# Patient Record
Sex: FEMALE | Employment: OTHER | URBAN - METROPOLITAN AREA
[De-identification: names, ages, dates, MRNs, and addresses within clinical notes are randomized per-mention and may not be internally consistent; named-entity substitution may affect disease eponyms.]

---

## 2024-01-17 ENCOUNTER — HOSPITAL ENCOUNTER (INPATIENT)
Age: 89
LOS: 21 days | Discharge: NURSING, REHAB OR LONG-TERM CARE | DRG: 305 | End: 2024-02-07
Attending: EMERGENCY MEDICINE | Admitting: INTERNAL MEDICINE
Payer: MEDICARE

## 2024-01-17 ENCOUNTER — APPOINTMENT (OUTPATIENT)
Dept: GENERAL RADIOLOGY | Age: 89
DRG: 305 | End: 2024-01-17
Payer: MEDICARE

## 2024-01-17 DIAGNOSIS — I16.0 HYPERTENSIVE URGENCY: ICD-10-CM

## 2024-01-17 DIAGNOSIS — E87.6 HYPOKALEMIA: Primary | ICD-10-CM

## 2024-01-17 LAB
ANION GAP SERPL CALCULATED.3IONS-SCNC: 10 MMOL/L (ref 9–17)
BACTERIA URNS QL MICRO: ABNORMAL
BASOPHILS # BLD: 0.1 K/UL (ref 0–0.2)
BASOPHILS NFR BLD: 1 % (ref 0–2)
BILIRUB UR QL STRIP: NEGATIVE
BUN SERPL-MCNC: 21 MG/DL (ref 8–23)
CALCIUM SERPL-MCNC: 9.3 MG/DL (ref 8.6–10.4)
CASTS #/AREA URNS LPF: ABNORMAL /LPF
CHLORIDE SERPL-SCNC: 103 MMOL/L (ref 98–107)
CLARITY UR: CLEAR
CO2 SERPL-SCNC: 28 MMOL/L (ref 20–31)
COLOR UR: YELLOW
CREAT SERPL-MCNC: 0.6 MG/DL (ref 0.5–0.9)
EOSINOPHIL # BLD: 0.1 K/UL (ref 0–0.4)
EOSINOPHILS RELATIVE PERCENT: 1 % (ref 0–4)
EPI CELLS #/AREA URNS HPF: ABNORMAL /HPF
ERYTHROCYTE [DISTWIDTH] IN BLOOD BY AUTOMATED COUNT: 13 % (ref 11.5–14.9)
GFR SERPL CREATININE-BSD FRML MDRD: >60 ML/MIN/1.73M2
GLUCOSE SERPL-MCNC: 97 MG/DL (ref 70–99)
GLUCOSE UR STRIP-MCNC: NEGATIVE MG/DL
HCT VFR BLD AUTO: 39.9 % (ref 36–46)
HGB BLD-MCNC: 12.4 G/DL (ref 12–16)
HGB UR QL STRIP.AUTO: NEGATIVE
KETONES UR STRIP-MCNC: NEGATIVE MG/DL
LEUKOCYTE ESTERASE UR QL STRIP: NEGATIVE
LYMPHOCYTES NFR BLD: 0.9 K/UL (ref 1–4.8)
LYMPHOCYTES RELATIVE PERCENT: 13 % (ref 24–44)
MCH RBC QN AUTO: 29.5 PG (ref 26–34)
MCHC RBC AUTO-ENTMCNC: 31.2 G/DL (ref 31–37)
MCV RBC AUTO: 94.6 FL (ref 80–100)
MONOCYTES NFR BLD: 0.7 K/UL (ref 0.1–1.3)
MONOCYTES NFR BLD: 10 % (ref 1–7)
NEUTROPHILS NFR BLD: 75 % (ref 36–66)
NEUTS SEG NFR BLD: 5.4 K/UL (ref 1.3–9.1)
NITRITE UR QL STRIP: NEGATIVE
PH UR STRIP: 6.5 [PH] (ref 5–8)
PLATELET # BLD AUTO: 326 K/UL (ref 150–450)
PMV BLD AUTO: 7.3 FL (ref 6–12)
POTASSIUM SERPL-SCNC: 3.3 MMOL/L (ref 3.7–5.3)
PROT UR STRIP-MCNC: ABNORMAL MG/DL
RBC # BLD AUTO: 4.21 M/UL (ref 4–5.2)
RBC #/AREA URNS HPF: ABNORMAL /HPF
SODIUM SERPL-SCNC: 141 MMOL/L (ref 135–144)
SP GR UR STRIP: 1.02 (ref 1–1.03)
UROBILINOGEN UR STRIP-ACNC: NORMAL EU/DL (ref 0–1)
WBC #/AREA URNS HPF: ABNORMAL /HPF
WBC OTHER # BLD: 7.2 K/UL (ref 3.5–11)

## 2024-01-17 PROCEDURE — 71045 X-RAY EXAM CHEST 1 VIEW: CPT

## 2024-01-17 PROCEDURE — 36415 COLL VENOUS BLD VENIPUNCTURE: CPT

## 2024-01-17 PROCEDURE — 81001 URINALYSIS AUTO W/SCOPE: CPT

## 2024-01-17 PROCEDURE — 6370000000 HC RX 637 (ALT 250 FOR IP)

## 2024-01-17 PROCEDURE — 93005 ELECTROCARDIOGRAM TRACING: CPT

## 2024-01-17 PROCEDURE — 6360000002 HC RX W HCPCS: Performed by: NURSE PRACTITIONER

## 2024-01-17 PROCEDURE — 99285 EMERGENCY DEPT VISIT HI MDM: CPT

## 2024-01-17 PROCEDURE — 2060000000 HC ICU INTERMEDIATE R&B

## 2024-01-17 PROCEDURE — 85025 COMPLETE CBC W/AUTO DIFF WBC: CPT

## 2024-01-17 PROCEDURE — 80048 BASIC METABOLIC PNL TOTAL CA: CPT

## 2024-01-17 PROCEDURE — 2580000003 HC RX 258: Performed by: NURSE PRACTITIONER

## 2024-01-17 RX ORDER — MAGNESIUM SULFATE HEPTAHYDRATE 40 MG/ML
2000 INJECTION, SOLUTION INTRAVENOUS PRN
Status: DISCONTINUED | OUTPATIENT
Start: 2024-01-17 | End: 2024-02-07 | Stop reason: HOSPADM

## 2024-01-17 RX ORDER — POTASSIUM CHLORIDE 7.45 MG/ML
10 INJECTION INTRAVENOUS PRN
Status: DISCONTINUED | OUTPATIENT
Start: 2024-01-17 | End: 2024-02-07 | Stop reason: HOSPADM

## 2024-01-17 RX ORDER — ONDANSETRON 2 MG/ML
4 INJECTION INTRAMUSCULAR; INTRAVENOUS EVERY 6 HOURS PRN
Status: DISCONTINUED | OUTPATIENT
Start: 2024-01-17 | End: 2024-02-07 | Stop reason: HOSPADM

## 2024-01-17 RX ORDER — SODIUM CHLORIDE 0.9 % (FLUSH) 0.9 %
5-40 SYRINGE (ML) INJECTION PRN
Status: DISCONTINUED | OUTPATIENT
Start: 2024-01-17 | End: 2024-02-07 | Stop reason: HOSPADM

## 2024-01-17 RX ORDER — ENOXAPARIN SODIUM 100 MG/ML
40 INJECTION SUBCUTANEOUS DAILY
Status: DISCONTINUED | OUTPATIENT
Start: 2024-01-18 | End: 2024-01-20

## 2024-01-17 RX ORDER — LISINOPRIL 5 MG/1
5 TABLET ORAL DAILY
Status: DISCONTINUED | OUTPATIENT
Start: 2024-01-18 | End: 2024-01-20

## 2024-01-17 RX ORDER — SODIUM CHLORIDE 9 MG/ML
INJECTION, SOLUTION INTRAVENOUS PRN
Status: DISCONTINUED | OUTPATIENT
Start: 2024-01-17 | End: 2024-02-07 | Stop reason: HOSPADM

## 2024-01-17 RX ORDER — HYDRALAZINE HYDROCHLORIDE 20 MG/ML
10 INJECTION INTRAMUSCULAR; INTRAVENOUS EVERY 6 HOURS PRN
Status: DISCONTINUED | OUTPATIENT
Start: 2024-01-17 | End: 2024-02-07 | Stop reason: HOSPADM

## 2024-01-17 RX ORDER — SODIUM CHLORIDE 0.9 % (FLUSH) 0.9 %
5-40 SYRINGE (ML) INJECTION EVERY 12 HOURS SCHEDULED
Status: DISCONTINUED | OUTPATIENT
Start: 2024-01-17 | End: 2024-02-07 | Stop reason: HOSPADM

## 2024-01-17 RX ORDER — ACETAMINOPHEN 650 MG/1
650 SUPPOSITORY RECTAL EVERY 6 HOURS PRN
Status: DISCONTINUED | OUTPATIENT
Start: 2024-01-17 | End: 2024-02-07 | Stop reason: HOSPADM

## 2024-01-17 RX ORDER — ONDANSETRON 4 MG/1
4 TABLET, ORALLY DISINTEGRATING ORAL EVERY 8 HOURS PRN
Status: DISCONTINUED | OUTPATIENT
Start: 2024-01-17 | End: 2024-02-07 | Stop reason: HOSPADM

## 2024-01-17 RX ORDER — POTASSIUM CHLORIDE 20 MEQ/1
40 TABLET, EXTENDED RELEASE ORAL PRN
Status: DISCONTINUED | OUTPATIENT
Start: 2024-01-17 | End: 2024-02-07 | Stop reason: HOSPADM

## 2024-01-17 RX ORDER — LISINOPRIL 20 MG/1
10 TABLET ORAL ONCE
Status: COMPLETED | OUTPATIENT
Start: 2024-01-17 | End: 2024-01-17

## 2024-01-17 RX ORDER — ACETAMINOPHEN 325 MG/1
650 TABLET ORAL EVERY 6 HOURS PRN
Status: DISCONTINUED | OUTPATIENT
Start: 2024-01-17 | End: 2024-02-07 | Stop reason: HOSPADM

## 2024-01-17 RX ORDER — POLYETHYLENE GLYCOL 3350 17 G/17G
17 POWDER, FOR SOLUTION ORAL DAILY PRN
Status: DISCONTINUED | OUTPATIENT
Start: 2024-01-17 | End: 2024-02-07 | Stop reason: HOSPADM

## 2024-01-17 RX ORDER — CARBOXYMETHYLCELLULOSE SODIUM 5 MG/ML
2 SOLUTION/ DROPS OPHTHALMIC ONCE
Status: COMPLETED | OUTPATIENT
Start: 2024-01-17 | End: 2024-01-17

## 2024-01-17 RX ADMIN — CARBOXYMETHYLCELLULOSE SODIUM 2 DROP: 5 SOLUTION/ DROPS OPHTHALMIC at 22:11

## 2024-01-17 RX ADMIN — SODIUM CHLORIDE, PRESERVATIVE FREE 10 ML: 5 INJECTION INTRAVENOUS at 23:29

## 2024-01-17 RX ADMIN — LISINOPRIL 10 MG: 20 TABLET ORAL at 22:11

## 2024-01-17 RX ADMIN — HYDRALAZINE HYDROCHLORIDE 10 MG: 20 INJECTION, SOLUTION INTRAMUSCULAR; INTRAVENOUS at 23:33

## 2024-01-17 RX ADMIN — POTASSIUM BICARBONATE 40 MEQ: 782 TABLET, EFFERVESCENT ORAL at 22:11

## 2024-01-17 ASSESSMENT — LIFESTYLE VARIABLES
HOW OFTEN DO YOU HAVE A DRINK CONTAINING ALCOHOL: NEVER
HOW MANY STANDARD DRINKS CONTAINING ALCOHOL DO YOU HAVE ON A TYPICAL DAY: PATIENT DOES NOT DRINK

## 2024-01-17 ASSESSMENT — PAIN - FUNCTIONAL ASSESSMENT: PAIN_FUNCTIONAL_ASSESSMENT: NONE - DENIES PAIN

## 2024-01-18 ENCOUNTER — APPOINTMENT (OUTPATIENT)
Age: 89
DRG: 305 | End: 2024-01-18
Payer: MEDICARE

## 2024-01-18 PROBLEM — Z91.199 NONCOMPLIANCE: Status: ACTIVE | Noted: 2024-01-18

## 2024-01-18 PROBLEM — R54 AGE-RELATED PHYSICAL DEBILITY: Status: ACTIVE | Noted: 2024-01-18

## 2024-01-18 PROBLEM — I47.10 SUPRAVENTRICULAR TACHYCARDIA: Status: ACTIVE | Noted: 2024-01-18

## 2024-01-18 PROBLEM — R46.0 POOR HYGIENE: Status: ACTIVE | Noted: 2024-01-18

## 2024-01-18 PROBLEM — I10 ESSENTIAL HYPERTENSION: Status: ACTIVE | Noted: 2024-01-18

## 2024-01-18 LAB
ANION GAP SERPL CALCULATED.3IONS-SCNC: 9 MMOL/L (ref 9–17)
BASOPHILS # BLD: 0.1 K/UL (ref 0–0.2)
BASOPHILS NFR BLD: 1 % (ref 0–2)
BUN SERPL-MCNC: 24 MG/DL (ref 8–23)
CALCIUM SERPL-MCNC: 8.4 MG/DL (ref 8.6–10.4)
CHLORIDE SERPL-SCNC: 109 MMOL/L (ref 98–107)
CO2 SERPL-SCNC: 26 MMOL/L (ref 20–31)
CREAT SERPL-MCNC: 0.5 MG/DL (ref 0.5–0.9)
ECHO BSA: 1.49 M2
ECHO EST RA PRESSURE: 3 MMHG
ECHO RIGHT VENTRICULAR SYSTOLIC PRESSURE (RVSP): 47 MMHG
ECHO TV REGURGITANT MAX VELOCITY: 3.3 M/S
EOSINOPHIL # BLD: 0.1 K/UL (ref 0–0.4)
EOSINOPHILS RELATIVE PERCENT: 3 % (ref 0–4)
ERYTHROCYTE [DISTWIDTH] IN BLOOD BY AUTOMATED COUNT: 12.5 % (ref 11.5–14.9)
GFR SERPL CREATININE-BSD FRML MDRD: >60 ML/MIN/1.73M2
GLUCOSE SERPL-MCNC: 92 MG/DL (ref 70–99)
HCT VFR BLD AUTO: 31 % (ref 36–46)
HGB BLD-MCNC: 10.2 G/DL (ref 12–16)
LYMPHOCYTES NFR BLD: 0.9 K/UL (ref 1–4.8)
LYMPHOCYTES RELATIVE PERCENT: 17 % (ref 24–44)
MCH RBC QN AUTO: 29.7 PG (ref 26–34)
MCHC RBC AUTO-ENTMCNC: 32.8 G/DL (ref 31–37)
MCV RBC AUTO: 90.6 FL (ref 80–100)
MONOCYTES NFR BLD: 0.8 K/UL (ref 0.1–1.3)
MONOCYTES NFR BLD: 14 % (ref 1–7)
NEUTROPHILS NFR BLD: 65 % (ref 36–66)
NEUTS SEG NFR BLD: 3.5 K/UL (ref 1.3–9.1)
PLATELET # BLD AUTO: 283 K/UL (ref 150–450)
PMV BLD AUTO: 7.5 FL (ref 6–12)
POTASSIUM SERPL-SCNC: 3.7 MMOL/L (ref 3.7–5.3)
RBC # BLD AUTO: 3.43 M/UL (ref 4–5.2)
SODIUM SERPL-SCNC: 144 MMOL/L (ref 135–144)
TROPONIN I SERPL HS-MCNC: 29 NG/L (ref 0–14)
WBC OTHER # BLD: 5.4 K/UL (ref 3.5–11)

## 2024-01-18 PROCEDURE — 36415 COLL VENOUS BLD VENIPUNCTURE: CPT

## 2024-01-18 PROCEDURE — 6370000000 HC RX 637 (ALT 250 FOR IP): Performed by: INTERNAL MEDICINE

## 2024-01-18 PROCEDURE — 97530 THERAPEUTIC ACTIVITIES: CPT

## 2024-01-18 PROCEDURE — 84484 ASSAY OF TROPONIN QUANT: CPT

## 2024-01-18 PROCEDURE — 2580000003 HC RX 258: Performed by: INTERNAL MEDICINE

## 2024-01-18 PROCEDURE — 80048 BASIC METABOLIC PNL TOTAL CA: CPT

## 2024-01-18 PROCEDURE — 93005 ELECTROCARDIOGRAM TRACING: CPT | Performed by: INTERNAL MEDICINE

## 2024-01-18 PROCEDURE — 2060000000 HC ICU INTERMEDIATE R&B

## 2024-01-18 PROCEDURE — 93306 TTE W/DOPPLER COMPLETE: CPT

## 2024-01-18 PROCEDURE — 97162 PT EVAL MOD COMPLEX 30 MIN: CPT

## 2024-01-18 PROCEDURE — 85025 COMPLETE CBC W/AUTO DIFF WBC: CPT

## 2024-01-18 PROCEDURE — 99223 1ST HOSP IP/OBS HIGH 75: CPT | Performed by: INTERNAL MEDICINE

## 2024-01-18 PROCEDURE — 97166 OT EVAL MOD COMPLEX 45 MIN: CPT

## 2024-01-18 PROCEDURE — 93306 TTE W/DOPPLER COMPLETE: CPT | Performed by: INTERNAL MEDICINE

## 2024-01-18 PROCEDURE — 6370000000 HC RX 637 (ALT 250 FOR IP): Performed by: NURSE PRACTITIONER

## 2024-01-18 PROCEDURE — 6360000002 HC RX W HCPCS: Performed by: NURSE PRACTITIONER

## 2024-01-18 RX ORDER — DILTIAZEM HYDROCHLORIDE 5 MG/ML
10 INJECTION INTRAVENOUS ONCE
Status: DISCONTINUED | OUTPATIENT
Start: 2024-01-18 | End: 2024-01-20

## 2024-01-18 RX ORDER — SODIUM CHLORIDE 9 MG/ML
INJECTION, SOLUTION INTRAVENOUS CONTINUOUS
Status: DISCONTINUED | OUTPATIENT
Start: 2024-01-18 | End: 2024-01-19

## 2024-01-18 RX ADMIN — METOPROLOL TARTRATE 12.5 MG: 25 TABLET, FILM COATED ORAL at 14:00

## 2024-01-18 RX ADMIN — SODIUM CHLORIDE: 9 INJECTION, SOLUTION INTRAVENOUS at 14:02

## 2024-01-18 RX ADMIN — LISINOPRIL 5 MG: 5 TABLET ORAL at 07:57

## 2024-01-18 RX ADMIN — ENOXAPARIN SODIUM 40 MG: 100 INJECTION SUBCUTANEOUS at 07:57

## 2024-01-18 ASSESSMENT — PAIN SCALES - GENERAL: PAINLEVEL_OUTOF10: 0

## 2024-01-18 NOTE — CARE COORDINATION
Case Management Assessment  Initial Evaluation    Date/Time of Evaluation: 1/18/2024 3:06 PM  Assessment Completed by: Thi Poe RN    If patient is discharged prior to next notation, then this note serves as note for discharge by case management.    Patient Name: Scarlett Ferguson                   YOB: 1931  Diagnosis: Hypokalemia [E87.6]  Hypertensive urgency [I16.0]                   Date / Time: 1/17/2024  7:52 PM    Patient Admission Status: Inpatient   Readmission Risk (Low < 19, Mod (19-27), High > 27): Readmission Risk Score: 9.8    Current PCP: No primary care provider on file.  PCP verified by CM? No (Has None)    Chart Reviewed: Yes      History Provided by: Patient  Patient Orientation: Alert and Oriented (Very [place])    Patient Cognition: Alert    Hospitalization in the last 30 days (Readmission):  No    If yes, Readmission Assessment in CM Navigator will be completed.    Advance Directives:      Code Status: Full Code   Patient's Primary Decision Maker is:        Discharge Planning:    Patient lives with: Alone Type of Home: House  Primary Care Giver: Self  Patient Support Systems include: [facility]   Current Financial resources: Medicare  Current community resources: None  Current services prior to admission: None            Current DME:              Type of Home Care services:  None    ADLS  Prior functional level: Independent in ADLs/IADLs  Current functional level: Other (see comment), Independent in ADLs/IADLs (Pt. has no Family members to assist, She does drive at times.)    PT AM-PAC: 10 /24  OT AM-PAC: 15 /24    Family can provide assistance at DC: No  Would you like Case Management to discuss the discharge plan with any other family members/significant others, and if so, who? No  Plans to Return to Present Housing: Other (see comment) (Very Poor living conditions, SNF, is rec. Pt. has 12 Cats, \"will NOT go to SNF, for the Cats come
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
## 2024-01-18 NOTE — ED NOTES
Pt provided with bed bath. Full linen change. Extensive debris from feet removed. Pt able to use BSC with minimal assist back to bed.
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
## 2024-01-18 NOTE — ED NOTES
Pt presents to the ED via squad along with [place] [employer]. Pt called an  out to her home earlier today. The  called 911 for a safety check after the  could not get into the home. [employer] observed \"deplorable\" living conditions when entering pt's home. [employer]  stated there was an extreme amount of cobwebs everywhere. Pt also has 4 cats that were urinating all over the house. Pt did not have working heat. Pt was utilizing a small space hater for heat. Pt was found sitting in a chair in soiled clothing. Pt was alert and oriented when talking to police. Pt refused to leave her home. Pt informed [employer] pt \"would ather die then leave her home.' TPD had to physically remove pt from her home in order to get pt to the hospital for an evaluation as it appears pt is not able to care for self. Pt has no heat in below zero weather conditions. [employer] competed a an application for a emergency admission. [employer] made a referral for Adult Protective Services.     SW will talk to pt once pt is medically cleared.    Detail Level: Simple Introduction Text (Please End With A Colon): The following procedure was deferred: Procedure To Be Performed At Next Visit: Excision
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
## 2024-01-19 PROBLEM — E87.6 HYPOKALEMIA: Status: ACTIVE | Noted: 2024-01-19

## 2024-01-19 LAB
ALBUMIN SERPL-MCNC: 3.3 G/DL (ref 3.5–5.2)
ALP SERPL-CCNC: 124 U/L (ref 35–104)
ALT SERPL-CCNC: 26 U/L (ref 5–33)
ANION GAP SERPL CALCULATED.3IONS-SCNC: 8 MMOL/L (ref 9–17)
AST SERPL-CCNC: 26 U/L
BASOPHILS # BLD: 0.1 K/UL (ref 0–0.2)
BASOPHILS NFR BLD: 1 % (ref 0–2)
BILIRUB SERPL-MCNC: 0.2 MG/DL (ref 0.3–1.2)
BUN SERPL-MCNC: 26 MG/DL (ref 8–23)
CALCIUM SERPL-MCNC: 8.3 MG/DL (ref 8.6–10.4)
CHLORIDE SERPL-SCNC: 110 MMOL/L (ref 98–107)
CO2 SERPL-SCNC: 25 MMOL/L (ref 20–31)
CREAT SERPL-MCNC: 0.6 MG/DL (ref 0.5–0.9)
EKG ATRIAL RATE: 53 BPM
EKG P AXIS: 67 DEGREES
EKG P-R INTERVAL: 146 MS
EKG Q-T INTERVAL: 306 MS
EKG Q-T INTERVAL: 452 MS
EKG QRS DURATION: 82 MS
EKG QRS DURATION: 90 MS
EKG QTC CALCULATION (BAZETT): 424 MS
EKG QTC CALCULATION (BAZETT): 490 MS
EKG R AXIS: 12 DEGREES
EKG R AXIS: 14 DEGREES
EKG T AXIS: -143 DEGREES
EKG T AXIS: 1 DEGREES
EKG VENTRICULAR RATE: 154 BPM
EKG VENTRICULAR RATE: 53 BPM
EOSINOPHIL # BLD: 0.1 K/UL (ref 0–0.4)
EOSINOPHILS RELATIVE PERCENT: 3 % (ref 0–4)
ERYTHROCYTE [DISTWIDTH] IN BLOOD BY AUTOMATED COUNT: 12.4 % (ref 11.5–14.9)
GFR SERPL CREATININE-BSD FRML MDRD: >60 ML/MIN/1.73M2
GLUCOSE SERPL-MCNC: 106 MG/DL (ref 70–99)
HCT VFR BLD AUTO: 30.1 % (ref 36–46)
HGB BLD-MCNC: 9.6 G/DL (ref 12–16)
LYMPHOCYTES NFR BLD: 1.1 K/UL (ref 1–4.8)
LYMPHOCYTES RELATIVE PERCENT: 24 % (ref 24–44)
MAGNESIUM SERPL-MCNC: 2 MG/DL (ref 1.6–2.6)
MCH RBC QN AUTO: 29.3 PG (ref 26–34)
MCHC RBC AUTO-ENTMCNC: 31.8 G/DL (ref 31–37)
MCV RBC AUTO: 92.1 FL (ref 80–100)
MONOCYTES NFR BLD: 0.7 K/UL (ref 0.1–1.3)
MONOCYTES NFR BLD: 15 % (ref 1–7)
NEUTROPHILS NFR BLD: 57 % (ref 36–66)
NEUTS SEG NFR BLD: 2.6 K/UL (ref 1.3–9.1)
PLATELET # BLD AUTO: 235 K/UL (ref 150–450)
PMV BLD AUTO: 7.3 FL (ref 6–12)
POTASSIUM SERPL-SCNC: 3.7 MMOL/L (ref 3.7–5.3)
PROT SERPL-MCNC: 5.4 G/DL (ref 6.4–8.3)
RBC # BLD AUTO: 3.27 M/UL (ref 4–5.2)
SODIUM SERPL-SCNC: 143 MMOL/L (ref 135–144)
WBC OTHER # BLD: 4.6 K/UL (ref 3.5–11)

## 2024-01-19 PROCEDURE — 93010 ELECTROCARDIOGRAM REPORT: CPT | Performed by: INTERNAL MEDICINE

## 2024-01-19 PROCEDURE — 85025 COMPLETE CBC W/AUTO DIFF WBC: CPT

## 2024-01-19 PROCEDURE — 6360000002 HC RX W HCPCS: Performed by: NURSE PRACTITIONER

## 2024-01-19 PROCEDURE — 97110 THERAPEUTIC EXERCISES: CPT

## 2024-01-19 PROCEDURE — 99232 SBSQ HOSP IP/OBS MODERATE 35: CPT | Performed by: INTERNAL MEDICINE

## 2024-01-19 PROCEDURE — 83735 ASSAY OF MAGNESIUM: CPT

## 2024-01-19 PROCEDURE — 97116 GAIT TRAINING THERAPY: CPT

## 2024-01-19 PROCEDURE — 6370000000 HC RX 637 (ALT 250 FOR IP): Performed by: INTERNAL MEDICINE

## 2024-01-19 PROCEDURE — 36415 COLL VENOUS BLD VENIPUNCTURE: CPT

## 2024-01-19 PROCEDURE — 99222 1ST HOSP IP/OBS MODERATE 55: CPT | Performed by: NURSE PRACTITIONER

## 2024-01-19 PROCEDURE — 6370000000 HC RX 637 (ALT 250 FOR IP): Performed by: NURSE PRACTITIONER

## 2024-01-19 PROCEDURE — 97530 THERAPEUTIC ACTIVITIES: CPT

## 2024-01-19 PROCEDURE — 2060000000 HC ICU INTERMEDIATE R&B

## 2024-01-19 PROCEDURE — 80053 COMPREHEN METABOLIC PANEL: CPT

## 2024-01-19 PROCEDURE — 2580000003 HC RX 258: Performed by: NURSE PRACTITIONER

## 2024-01-19 RX ADMIN — SODIUM CHLORIDE, PRESERVATIVE FREE 10 ML: 5 INJECTION INTRAVENOUS at 20:49

## 2024-01-19 RX ADMIN — HYDRALAZINE HYDROCHLORIDE 10 MG: 20 INJECTION, SOLUTION INTRAMUSCULAR; INTRAVENOUS at 16:56

## 2024-01-19 RX ADMIN — LISINOPRIL 5 MG: 5 TABLET ORAL at 08:55

## 2024-01-19 RX ADMIN — METOPROLOL TARTRATE 12.5 MG: 25 TABLET, FILM COATED ORAL at 08:54

## 2024-01-19 RX ADMIN — ENOXAPARIN SODIUM 40 MG: 100 INJECTION SUBCUTANEOUS at 08:54

## 2024-01-19 RX ADMIN — METOPROLOL TARTRATE 12.5 MG: 25 TABLET, FILM COATED ORAL at 20:49

## 2024-01-19 ASSESSMENT — PAIN SCALES - GENERAL: PAINLEVEL_OUTOF10: 0

## 2024-01-19 NOTE — CARE COORDINATION
Writer met with pt to discuss discharge plans.    Pt states she can stay with her neighbor Bridget until Spring. Writer spoke to Bridget via telephone. Bridget states there is no way this pt can stay with her. Bridget states she is willing to take care of pt cats while she is at rehab, but there is no key available.    Writer informed pt of this. Pt became increasingly anxious about cats saying they were going to die in the house without water. Writer tried to reassure pt Bridget went and looked through the window at the home.    Pt states writer needs to call a  so the [employer] can get into the home and take care of the cats.    Writer dialed Bridget for pt to speak with regarding the cats and a place to stay.    Writer placed call to [facility], placed report with Vandana. Vandana states there has been no report made for this pt since 2020. Per LSW notation on 1/17 [employer] made a referral for Adult Protective Services. [place] states report may not have yet been received.    Writer informed APS of  calling 911 unable to get in pt home. [employer] observed deplorable living conditions and stated there was an extreme amount of cobwebs everywhere. Pt has cats that are urinating all over the house and no working heat and was found sitting in her chair in soiled clothing.    Writer left facility of choice list with pt. At this time pt is not agreeable to choosing a facility and states she has to get home to her cats. Writer explained therapy is recommending therapy upon discharge and pt still refuses stating she has to get to her cats.  Electronically signed by LEONEL Yuen on 1/19/2024 at 3:19 PM

## 2024-01-19 NOTE — CARE COORDINATION
Writer spoke to Don, from [facility], & they can accept for services. He will follow.

## 2024-01-19 NOTE — CARE COORDINATION
Writer placed call to Bridget regarding  call she made. Bridget is unsure of of the  she called, she called from her brother's phone.    Bridget states the  stated pt must be present to open the home. Bridget states she will be home all day tomorrow and has no vehicle to  pt at the hospital, but is willing to meet pt at the house as she lives four houses away.    Writer explained the situation of ambulance  transporting the pt home and Bridget meeting pt at the house with the . Bridget is agreeable to helping with this situation tomorrow.   Electronically signed by LEONEL Yuen on 1/19/2024 at 4:45 PM

## 2024-01-20 LAB
ANION GAP SERPL CALCULATED.3IONS-SCNC: 10 MMOL/L (ref 9–17)
BASOPHILS # BLD: 0.1 K/UL (ref 0–0.2)
BASOPHILS NFR BLD: 1 % (ref 0–2)
BUN SERPL-MCNC: 24 MG/DL (ref 8–23)
CALCIUM SERPL-MCNC: 8.5 MG/DL (ref 8.6–10.4)
CHLORIDE SERPL-SCNC: 109 MMOL/L (ref 98–107)
CO2 SERPL-SCNC: 24 MMOL/L (ref 20–31)
CREAT SERPL-MCNC: 0.5 MG/DL (ref 0.5–0.9)
EOSINOPHIL # BLD: 0.1 K/UL (ref 0–0.4)
EOSINOPHILS RELATIVE PERCENT: 2 % (ref 0–4)
ERYTHROCYTE [DISTWIDTH] IN BLOOD BY AUTOMATED COUNT: 12.6 % (ref 11.5–14.9)
GFR SERPL CREATININE-BSD FRML MDRD: >60 ML/MIN/1.73M2
GLUCOSE SERPL-MCNC: 102 MG/DL (ref 70–99)
HCT VFR BLD AUTO: 29.8 % (ref 36–46)
HGB BLD-MCNC: 9.8 G/DL (ref 12–16)
LYMPHOCYTES NFR BLD: 1.4 K/UL (ref 1–4.8)
LYMPHOCYTES RELATIVE PERCENT: 24 % (ref 24–44)
MAGNESIUM SERPL-MCNC: 2.1 MG/DL (ref 1.6–2.6)
MCH RBC QN AUTO: 29.9 PG (ref 26–34)
MCHC RBC AUTO-ENTMCNC: 33.1 G/DL (ref 31–37)
MCV RBC AUTO: 90.5 FL (ref 80–100)
MONOCYTES NFR BLD: 0.6 K/UL (ref 0.1–1.3)
MONOCYTES NFR BLD: 11 % (ref 1–7)
NEUTROPHILS NFR BLD: 62 % (ref 36–66)
NEUTS SEG NFR BLD: 3.6 K/UL (ref 1.3–9.1)
PLATELET # BLD AUTO: 233 K/UL (ref 150–450)
PMV BLD AUTO: 7.9 FL (ref 6–12)
POTASSIUM SERPL-SCNC: 3.8 MMOL/L (ref 3.7–5.3)
RBC # BLD AUTO: 3.29 M/UL (ref 4–5.2)
SODIUM SERPL-SCNC: 143 MMOL/L (ref 135–144)
TSH SERPL DL<=0.05 MIU/L-ACNC: 2.52 UIU/ML (ref 0.3–5)
WBC OTHER # BLD: 5.9 K/UL (ref 3.5–11)

## 2024-01-20 PROCEDURE — 2580000003 HC RX 258: Performed by: NURSE PRACTITIONER

## 2024-01-20 PROCEDURE — 84443 ASSAY THYROID STIM HORMONE: CPT

## 2024-01-20 PROCEDURE — 6360000002 HC RX W HCPCS: Performed by: INTERNAL MEDICINE

## 2024-01-20 PROCEDURE — 36415 COLL VENOUS BLD VENIPUNCTURE: CPT

## 2024-01-20 PROCEDURE — 85025 COMPLETE CBC W/AUTO DIFF WBC: CPT

## 2024-01-20 PROCEDURE — 99232 SBSQ HOSP IP/OBS MODERATE 35: CPT | Performed by: INTERNAL MEDICINE

## 2024-01-20 PROCEDURE — 97530 THERAPEUTIC ACTIVITIES: CPT

## 2024-01-20 PROCEDURE — 6370000000 HC RX 637 (ALT 250 FOR IP): Performed by: INTERNAL MEDICINE

## 2024-01-20 PROCEDURE — 2060000000 HC ICU INTERMEDIATE R&B

## 2024-01-20 PROCEDURE — 97535 SELF CARE MNGMENT TRAINING: CPT

## 2024-01-20 PROCEDURE — 80048 BASIC METABOLIC PNL TOTAL CA: CPT

## 2024-01-20 PROCEDURE — 83735 ASSAY OF MAGNESIUM: CPT

## 2024-01-20 PROCEDURE — 6370000000 HC RX 637 (ALT 250 FOR IP): Performed by: NURSE PRACTITIONER

## 2024-01-20 RX ORDER — LISINOPRIL 20 MG/1
20 TABLET ORAL DAILY
Qty: 30 TABLET | Refills: 3 | Status: CANCELLED | OUTPATIENT
Start: 2024-01-21

## 2024-01-20 RX ORDER — ENOXAPARIN SODIUM 100 MG/ML
40 INJECTION SUBCUTANEOUS DAILY
Status: DISCONTINUED | OUTPATIENT
Start: 2024-01-20 | End: 2024-02-07 | Stop reason: HOSPADM

## 2024-01-20 RX ORDER — LANOLIN ALCOHOL/MO/W.PET/CERES
3 CREAM (GRAM) TOPICAL NIGHTLY PRN
Status: DISCONTINUED | OUTPATIENT
Start: 2024-01-20 | End: 2024-02-07 | Stop reason: HOSPADM

## 2024-01-20 RX ORDER — LISINOPRIL 20 MG/1
20 TABLET ORAL DAILY
Status: DISCONTINUED | OUTPATIENT
Start: 2024-01-20 | End: 2024-01-22

## 2024-01-20 RX ADMIN — SODIUM CHLORIDE, PRESERVATIVE FREE 10 ML: 5 INJECTION INTRAVENOUS at 10:35

## 2024-01-20 RX ADMIN — Medication 3 MG: at 22:39

## 2024-01-20 RX ADMIN — METOPROLOL TARTRATE 12.5 MG: 25 TABLET, FILM COATED ORAL at 19:29

## 2024-01-20 RX ADMIN — SODIUM CHLORIDE, PRESERVATIVE FREE 10 ML: 5 INJECTION INTRAVENOUS at 19:29

## 2024-01-20 RX ADMIN — METOPROLOL TARTRATE 12.5 MG: 25 TABLET, FILM COATED ORAL at 13:15

## 2024-01-20 RX ADMIN — LISINOPRIL 20 MG: 20 TABLET ORAL at 10:34

## 2024-01-20 RX ADMIN — ENOXAPARIN SODIUM 40 MG: 100 INJECTION SUBCUTANEOUS at 10:34

## 2024-01-20 NOTE — CARE COORDINATION
DISCHARGE PLANNING NOTE:    Informed Dr. Don that LEONEL Barreto, spoke with patient regarding her statement about wanting to go to Assisted Living. Mary Lou spoke with patient and she states that is her plan in about 3-4 months. Her plan for right now is still to go home with [facility].    Electronically signed by Jyoti Modi RN on 1/20/2024 at 10:12 AM     [id number]

## 2024-01-20 NOTE — CARE COORDINATION
Writer met with pt to discuss discuss discharge plans. Writer encouraged writer again to a placement at a skilled nursing facility for a few weeks for therapy. Pt states she is not interested and has a house to get rid of and wants to move into assisted living in a few months. Pt is agreeable to [facility].    Writer also helped pt look through belongings to obtain payment for services and to see if there was any key. Pt has storm door key but no house key.    Writer placed call to pt neighbor Bridget to verify she is still agreeable to meet pt at home today if  services can be obtained.    Writer will place calls to local locksmiths.  Electronically signed by LEONEL Yuen on 1/20/2024 at 10:12 AM    Writer spoke to Vero with [employer]' at [phone] in room with pt. Pt gave writer permission to give credit card information via telephone to confirm appointment. Appointment confirmed for [facility] lockout service, writer provided number for contact if there is a cancellation and Bridget's contact information has been given to the  service as point of contact.    Writer placed call to Bridget to confirm.    Writer placed call to [facility] for scheduling. Writer explained situation of  meeting pt and neighbor Bridget at the home. Forms faxed, agreeable to 1230 .    Writer provided pt with the 2023 'Older Adults Resource Guide' by the [facility] [employer] [facility] along with AOOA programs hand out.  Electronically signed by LEONEL Yuen on 1/20/2024 at 10:56 AM    Pt is now unable to transfer without two staff members per charge WYATT Mclain. Pt is still insisting on going home.   Writer placed call to RN PETRA lead Mimi regarding this issue. Mimi suggests a capacity evaluation on this pt and to verify there is PT/OT ordered for this pt.  Writer informed charge WYATT Mclain of this.  Electronically signed by LEONEL Yuen on 1/20/2024 at 1:06

## 2024-01-20 NOTE — DISCHARGE INSTR - COC
Continuity of Care Form    Patient Name: Scarlett Ferguson   :  11/3/1931  MRN:  [id number]    Admit date:  2024  Discharge date:      Code Status Order: DNR-CC   Advance Directives:     Admitting Physician:  Cherri Mattson MD  PCP: No primary care provider on file.    Discharging Nurse: Lurdes GILLETTE RN   Discharging Hospital Unit/Room#: 2108/2108-01  Discharging Unit Phone Number: [phone]    Emergency Contact:   Extended Emergency Contact Information  Primary Emergency Contact: amish baker  Mobile Phone: [phone]  Relation: Friend    Past Surgical History:  History reviewed. No pertinent surgical history.    Immunization History:     There is no immunization history on file for this patient.    Active Problems:  Patient Active Problem List   Diagnosis Code    Hypertensive urgency I16.0    Essential hypertension I10    Noncompliance Z91.199    Supraventricular tachycardia I47.10    Poor hygiene R46.0    Age-related physical debility R54    Hypokalemia E87.6       Isolation/Infection:   Isolation            No Isolation          Patient Infection Status       None to display            Nurse Assessment:  Last Vital Signs: BP (!) 151/68   Pulse 87   Temp 98.3 °F (36.8 °C) (Oral)   Resp 17   Ht 1.524 m (5')   Wt 52 kg (114 lb 10.2 oz)   SpO2 94%   BMI 22.39 kg/m²     Last documented pain score (0-10 scale): Pain Level: 0  Last Weight:   Wt Readings from Last 1 Encounters:   24 52 kg (114 lb 10.2 oz)     Mental Status:  disoriented and alert    IV Access:  - None    Nursing Mobility/ADLs:  Walking   Assisted  Transfer  Assisted  Bathing  Assisted  Dressing  Assisted  Toileting  Assisted  Feeding  Assisted  Med Admin  Assisted  Med Delivery   whole    Wound Care Documentation and Therapy:        Elimination:  Continence:   Bowel: Yes  Bladder: No  Urinary Catheter: None   Colostomy/Ileostomy/Ileal Conduit: No       Date of Last BM:     No intake or output data in the 24 hours ending 24

## 2024-01-20 NOTE — DISCHARGE INSTRUCTIONS
Recommend monitoring blood pressures at home  Follow-up with primary in 1 week with log of blood pressures

## 2024-01-20 NOTE — CARE COORDINATION
Transportation arranged for patient to go to [address] [place] [address] at 1230 via [facility] by wheelchair.    Pt neighbor [employer] ([phone]) is meeting at home address along with [employer] to obtain entry.  Electronically signed by LEONEL uYen on 1/20/2024 at 11:00 AM    Writer was told pt is not able to transfer without 2 staff members. [facility] transport cancelled.  Electronically signed by LEONEL Yuen on 1/20/2024 at 12:59 PM

## 2024-01-20 NOTE — CARE COORDINATION
IMM letter provided to patient.  Patient offered four hours to make informed decision regarding appeal process; patient agreeable to discharge.     Electronically signed by LEONEL Yuen on 1/20/2024 at 11:51 AM

## 2024-01-21 ENCOUNTER — APPOINTMENT (OUTPATIENT)
Dept: CT IMAGING | Age: 89
DRG: 305 | End: 2024-01-21
Payer: MEDICARE

## 2024-01-21 PROCEDURE — 71260 CT THORAX DX C+: CPT

## 2024-01-21 PROCEDURE — 6360000004 HC RX CONTRAST MEDICATION: Performed by: INTERNAL MEDICINE

## 2024-01-21 PROCEDURE — 6360000002 HC RX W HCPCS: Performed by: NURSE PRACTITIONER

## 2024-01-21 PROCEDURE — 2580000003 HC RX 258: Performed by: NURSE PRACTITIONER

## 2024-01-21 PROCEDURE — 1200000000 HC SEMI PRIVATE

## 2024-01-21 PROCEDURE — 6370000000 HC RX 637 (ALT 250 FOR IP): Performed by: NURSE PRACTITIONER

## 2024-01-21 PROCEDURE — 6360000002 HC RX W HCPCS: Performed by: INTERNAL MEDICINE

## 2024-01-21 PROCEDURE — 2580000003 HC RX 258: Performed by: INTERNAL MEDICINE

## 2024-01-21 PROCEDURE — 6370000000 HC RX 637 (ALT 250 FOR IP): Performed by: INTERNAL MEDICINE

## 2024-01-21 PROCEDURE — 99232 SBSQ HOSP IP/OBS MODERATE 35: CPT | Performed by: INTERNAL MEDICINE

## 2024-01-21 RX ORDER — 0.9 % SODIUM CHLORIDE 0.9 %
100 INTRAVENOUS SOLUTION INTRAVENOUS ONCE
Status: COMPLETED | OUTPATIENT
Start: 2024-01-21 | End: 2024-01-21

## 2024-01-21 RX ORDER — IBUPROFEN 400 MG/1
400 TABLET ORAL EVERY 6 HOURS PRN
Status: DISCONTINUED | OUTPATIENT
Start: 2024-01-21 | End: 2024-02-07 | Stop reason: HOSPADM

## 2024-01-21 RX ORDER — SODIUM CHLORIDE 0.9 % (FLUSH) 0.9 %
10 SYRINGE (ML) INJECTION PRN
Status: DISCONTINUED | OUTPATIENT
Start: 2024-01-21 | End: 2024-02-07 | Stop reason: HOSPADM

## 2024-01-21 RX ADMIN — IOPAMIDOL 75 ML: 755 INJECTION, SOLUTION INTRAVENOUS at 11:35

## 2024-01-21 RX ADMIN — SODIUM CHLORIDE, PRESERVATIVE FREE 10 ML: 5 INJECTION INTRAVENOUS at 20:17

## 2024-01-21 RX ADMIN — METOPROLOL TARTRATE 12.5 MG: 25 TABLET, FILM COATED ORAL at 10:41

## 2024-01-21 RX ADMIN — SODIUM CHLORIDE, PRESERVATIVE FREE 10 ML: 5 INJECTION INTRAVENOUS at 11:35

## 2024-01-21 RX ADMIN — ENOXAPARIN SODIUM 40 MG: 100 INJECTION SUBCUTANEOUS at 10:44

## 2024-01-21 RX ADMIN — SODIUM CHLORIDE 100 ML: 9 INJECTION, SOLUTION INTRAVENOUS at 11:36

## 2024-01-21 RX ADMIN — HYDRALAZINE HYDROCHLORIDE 10 MG: 20 INJECTION, SOLUTION INTRAMUSCULAR; INTRAVENOUS at 12:38

## 2024-01-21 RX ADMIN — IBUPROFEN 400 MG: 400 TABLET, FILM COATED ORAL at 20:16

## 2024-01-21 RX ADMIN — IBUPROFEN 400 MG: 400 TABLET, FILM COATED ORAL at 12:36

## 2024-01-21 RX ADMIN — LISINOPRIL 20 MG: 20 TABLET ORAL at 10:41

## 2024-01-21 RX ADMIN — METOPROLOL TARTRATE 12.5 MG: 25 TABLET, FILM COATED ORAL at 20:16

## 2024-01-21 ASSESSMENT — PAIN SCALES - GENERAL
PAINLEVEL_OUTOF10: 3
PAINLEVEL_OUTOF10: 3

## 2024-01-21 ASSESSMENT — PAIN DESCRIPTION - LOCATION: LOCATION: GENERALIZED

## 2024-01-21 ASSESSMENT — PAIN DESCRIPTION - DESCRIPTORS: DESCRIPTORS: ACHING

## 2024-01-21 NOTE — CARE COORDINATION
DISCHARGE PLANNING NOTE:    Discharge plan is undetermined at this time.  Plan was for patient to be discharged home with [facility] yesterday, however this was determined to be unsafe. Pt refuses SNF. Will need new plan tomorrow.    Electronically signed by Jyoti Modi RN on 1/21/2024 at 1:26 PM

## 2024-01-22 LAB
ANION GAP SERPL CALCULATED.3IONS-SCNC: 7 MMOL/L (ref 9–17)
BUN SERPL-MCNC: 28 MG/DL (ref 8–23)
CALCIUM SERPL-MCNC: 8.2 MG/DL (ref 8.6–10.4)
CHLORIDE SERPL-SCNC: 110 MMOL/L (ref 98–107)
CO2 SERPL-SCNC: 27 MMOL/L (ref 20–31)
CREAT SERPL-MCNC: 0.6 MG/DL (ref 0.5–0.9)
GFR SERPL CREATININE-BSD FRML MDRD: >60 ML/MIN/1.73M2
GLUCOSE SERPL-MCNC: 104 MG/DL (ref 70–99)
POTASSIUM SERPL-SCNC: 3.9 MMOL/L (ref 3.7–5.3)
SODIUM SERPL-SCNC: 144 MMOL/L (ref 135–144)

## 2024-01-22 PROCEDURE — 97535 SELF CARE MNGMENT TRAINING: CPT

## 2024-01-22 PROCEDURE — 6370000000 HC RX 637 (ALT 250 FOR IP): Performed by: INTERNAL MEDICINE

## 2024-01-22 PROCEDURE — 80048 BASIC METABOLIC PNL TOTAL CA: CPT

## 2024-01-22 PROCEDURE — 2580000003 HC RX 258: Performed by: NURSE PRACTITIONER

## 2024-01-22 PROCEDURE — 36415 COLL VENOUS BLD VENIPUNCTURE: CPT

## 2024-01-22 PROCEDURE — 6360000002 HC RX W HCPCS: Performed by: NURSE PRACTITIONER

## 2024-01-22 PROCEDURE — 99232 SBSQ HOSP IP/OBS MODERATE 35: CPT | Performed by: INTERNAL MEDICINE

## 2024-01-22 PROCEDURE — 97530 THERAPEUTIC ACTIVITIES: CPT

## 2024-01-22 PROCEDURE — 97110 THERAPEUTIC EXERCISES: CPT

## 2024-01-22 PROCEDURE — 6360000002 HC RX W HCPCS: Performed by: INTERNAL MEDICINE

## 2024-01-22 PROCEDURE — 97116 GAIT TRAINING THERAPY: CPT

## 2024-01-22 PROCEDURE — 1200000000 HC SEMI PRIVATE

## 2024-01-22 RX ORDER — LISINOPRIL 20 MG/1
40 TABLET ORAL DAILY
Status: DISCONTINUED | OUTPATIENT
Start: 2024-01-23 | End: 2024-02-07 | Stop reason: HOSPADM

## 2024-01-22 RX ORDER — LISINOPRIL 20 MG/1
20 TABLET ORAL ONCE
Status: COMPLETED | OUTPATIENT
Start: 2024-01-22 | End: 2024-01-22

## 2024-01-22 RX ORDER — HYDRALAZINE HYDROCHLORIDE 20 MG/ML
10 INJECTION INTRAMUSCULAR; INTRAVENOUS ONCE
Status: COMPLETED | OUTPATIENT
Start: 2024-01-22 | End: 2024-01-22

## 2024-01-22 RX ADMIN — METOPROLOL TARTRATE 12.5 MG: 25 TABLET, FILM COATED ORAL at 21:06

## 2024-01-22 RX ADMIN — HYDRALAZINE HYDROCHLORIDE 10 MG: 20 INJECTION, SOLUTION INTRAMUSCULAR; INTRAVENOUS at 12:21

## 2024-01-22 RX ADMIN — LISINOPRIL 20 MG: 20 TABLET ORAL at 10:03

## 2024-01-22 RX ADMIN — HYDRALAZINE HYDROCHLORIDE 10 MG: 20 INJECTION, SOLUTION INTRAMUSCULAR; INTRAVENOUS at 17:06

## 2024-01-22 RX ADMIN — METOPROLOL TARTRATE 12.5 MG: 25 TABLET, FILM COATED ORAL at 10:02

## 2024-01-22 RX ADMIN — SODIUM CHLORIDE, PRESERVATIVE FREE 10 ML: 5 INJECTION INTRAVENOUS at 10:22

## 2024-01-22 RX ADMIN — LISINOPRIL 20 MG: 20 TABLET ORAL at 17:06

## 2024-01-22 RX ADMIN — ENOXAPARIN SODIUM 40 MG: 100 INJECTION SUBCUTANEOUS at 10:02

## 2024-01-22 ASSESSMENT — PAIN SCALES - GENERAL
PAINLEVEL_OUTOF10: 0

## 2024-01-22 NOTE — CARE COORDINATION
Writer notified Dr Don to see if we can get a psych consult to check her capacity to make sure she is able to make decisions.  Will wait for that psych note.    Writer called Adult protective Services and spoke to her  assigned to her case Luis Fernando  [phone] - Luis Fernando asked about her capacity, We advised we are waiting for psych to see her today.  Luis Fernando advised she is scheduled to some see her on 1/24/24 but if she is discharged home before then to call her.  Luis Fernando advised that if she is alert and oriented and has capacity, We can't hold her here.    Writer also reached out to Santino in Security to see if he can try finding any living relatives of patient.  He is investigating and will update writer when he has something.    Electronically signed by Karissa Olguin RN on 1/22/2024 at 10:21 AM

## 2024-01-23 PROBLEM — F05 DELIRIUM DUE TO ANOTHER MEDICAL CONDITION: Status: ACTIVE | Noted: 2024-01-23

## 2024-01-23 PROCEDURE — 6360000002 HC RX W HCPCS: Performed by: NURSE PRACTITIONER

## 2024-01-23 PROCEDURE — 90792 PSYCH DIAG EVAL W/MED SRVCS: CPT | Performed by: PSYCHIATRY & NEUROLOGY

## 2024-01-23 PROCEDURE — 99232 SBSQ HOSP IP/OBS MODERATE 35: CPT | Performed by: INTERNAL MEDICINE

## 2024-01-23 PROCEDURE — 97530 THERAPEUTIC ACTIVITIES: CPT

## 2024-01-23 PROCEDURE — 1200000000 HC SEMI PRIVATE

## 2024-01-23 PROCEDURE — 6360000002 HC RX W HCPCS: Performed by: INTERNAL MEDICINE

## 2024-01-23 PROCEDURE — 97535 SELF CARE MNGMENT TRAINING: CPT

## 2024-01-23 PROCEDURE — 97110 THERAPEUTIC EXERCISES: CPT

## 2024-01-23 PROCEDURE — 6370000000 HC RX 637 (ALT 250 FOR IP): Performed by: INTERNAL MEDICINE

## 2024-01-23 PROCEDURE — 97116 GAIT TRAINING THERAPY: CPT

## 2024-01-23 RX ORDER — HYDROCHLOROTHIAZIDE 25 MG/1
12.5 TABLET ORAL DAILY
Status: DISCONTINUED | OUTPATIENT
Start: 2024-01-23 | End: 2024-02-03

## 2024-01-23 RX ADMIN — ENOXAPARIN SODIUM 40 MG: 100 INJECTION SUBCUTANEOUS at 11:02

## 2024-01-23 RX ADMIN — LISINOPRIL 40 MG: 20 TABLET ORAL at 11:02

## 2024-01-23 RX ADMIN — HYDRALAZINE HYDROCHLORIDE 10 MG: 20 INJECTION, SOLUTION INTRAMUSCULAR; INTRAVENOUS at 06:38

## 2024-01-23 RX ADMIN — METOPROLOL TARTRATE 12.5 MG: 25 TABLET, FILM COATED ORAL at 11:02

## 2024-01-23 RX ADMIN — METOPROLOL TARTRATE 12.5 MG: 25 TABLET, FILM COATED ORAL at 19:32

## 2024-01-23 RX ADMIN — HYDROCHLOROTHIAZIDE 12.5 MG: 25 TABLET ORAL at 13:15

## 2024-01-23 ASSESSMENT — PAIN SCALES - GENERAL
PAINLEVEL_OUTOF10: 0

## 2024-01-23 NOTE — CONSULTS
Palliative Care Inpatient Consult    NAME:  Scarlett Ferguson  MEDICAL RECORD NUMBER:  [id number]  AGE: 89 y.o.   GENDER: female  : 11/3/1931  TODAY'S DATE:  2024    Reasons for Consultation:    Symptom and/or pain management  Provision of information regarding PC and/or hospice philosophies  Complex, time-intensive communication and interdisciplinary psychosocial support  Clarification of goals of care and/or assistance with difficult decision-making  Guidance in regards to resources and transition(s)    Members of PC team contributing to this consultation are : Elsa Mcdonnell, Palliative Care APRN-[facility]    Plan      Palliative Interaction:    I went to see patient today who was sitting in bed originally sleeping. She immediately awoke to verbal stimuli. Patient was pleasant and AOx3. She reports that she has not taken her at-home cataracts medication for the past 2 days and reports fatigue the past few weeks.     I explained my Palliative role and discussed about her symptoms and health status. Patient had a swollen, red left eye with ectropion and her left elbow had a healing lesion. She denies any pain, including no pain in her left eye, chest pain, and SOB.     I discussed Code Status options and [place] Next of Kin law. Patient reported that she does not want to go to an ICU, does not want to be intubated, and does not want any aggressive and/or invasive treatments. I informed her that her choices reflected DNR-CC. She was agreeable. I gave her a DNR form to sign and explained DNR-CC again. Patient voiced understanding and signed the form with 2 nurses and PA-S present. Patient is now DNR-CC. She also reports that although her neighbor, Bridget, is her current contact, patient does not want her to be the primary decision maker. She reports that she is on drugs. Patient does not have a spouse, children, grandchildren, or siblings. However, she reports having nieces and nephews in [place], but is not close to 
Department of Psychiatry  Behavioral Health Consult    REASON FOR CONSULT: decision capacity evaluation     CONSULTING PHYSICIAN: Dr. Don     History obtained from: patient, chart    HISTORY OF PRESENT ILLNESS:    The patient is a 89 y.o. female with no significant past psychiatric history who presented to the hospital with weakness and inability to care for herself and is admitted to the hospital for hypertensive urgency. Also found to have bradycardia and SVT. Psychiatry consulted to assess decision making capacity.     Patient seen and evaluated at bedside 1/23. Noted bradycardia with severely elevated BP, Qtc 490. Patient sitting up in bed with legs off the side and eating at bedside table. She is pleasant, is hard of hearing, and makes good eye contact. She states she just wants to go home. She states she was forcibly brought to the hospital by 5 police officers after \"someone bad\" told them she was there and not doing well. According to EMS, she was found by an  living in very poor conditions with soiled clothing, poor hygiene, 1 working light in the house, and no heat. She was using a space heater but was too weak to get up from her chair in her home. She admits to using \"several space heaters\" to heat her home, but states she is able to cook, clean, feed, bath, and pay her own bills. She acknowledges her severely elevated blood pressure, but states this is just due to her being brought to the hospital and believes being in the hospital is making her blood pressure worse. She does not understand the risks/benefits of treatment for her current medical conditions. She states she also has arthritis and allergies for which she takes some medications such as ibuprofen and an allergy pill, but denies any other medical conditions including denying blood pressure issues. She repeatedly states that she would like to go back to her house to get \"everything in order\" and to take care of her 12 cats, and 
CVA.  She had a generalized weakness with no focal deficit.  On admission her blood pressure was elevated.  She takes over-the-counter medication for her arthritis and antiallergy medication .patient also said that she never had any problem she walks around she do yard work she does get some palpitations sometime and it goes away when she rest . Her initial electrocardiogram showed sinus bradycardia, PAC.  On admission blood pressure was elevated up to 216/106, heart rate was 68 to 84 bpm, temperature 97.6, oxygen saturation 95%  On admission sodium 141, potassium 3.3, BUN 21, creatinine 0.6  WBC 7.2, hemoglobin 12.4, platelets 326  Chest x-ray showed no acute cardiopulmonary process  She was started on lisinopril.  She had episode of supraventricular tachycardia heart rate up to 150.  She was started on metoprolol 12.5 mg twice daily and she had episode of bradycardia heart rate below 40 while she was asleep.  Beta-blockers discontinued        Current evaluation  Frail looking female she was having her lunch  She can get out of bed without help  Able to walk few steps without help  She denies any chest pain or palpitation  No further episode of bradycardia  Current heart rate about 85 bpm occasional PAC as per ECG monitor  Chest expansion is poor, diminished breath sounds no sign of pleural  No peripheral edema    Lab work 1/20/2024  TSH 2.52  Hemoglobin 9.8, WBC 5.9, platelets 233  Sodium 143, potassium 3.8, CO2 24, BUN 24, creatinine 0.5, magnesium 2.1    Medications:   Scheduled Meds:   lisinopril  20 mg Oral Daily    enoxaparin  40 mg SubCUTAneous Daily    sodium chloride flush  5-40 mL IntraVENous 2 times per day     Continuous Infusions:   sodium chloride       CBC:   Recent Labs     01/18/24  0538 01/19/24  0506 01/20/24  0538   WBC 5.4 4.6 5.9   HGB 10.2* 9.6* 9.8*    235 233     BMP:    Recent Labs     01/18/24  0538 01/19/24  0506 01/20/24  0538    143 143   K 3.7 3.7 3.8   * 110* 109*

## 2024-01-23 NOTE — CARE COORDINATION
ONGOING DISCHARGE PLAN:    Patient is alert and oriented. Patient states she needs to return home today for her cats (reportedly has 12 cats at home).     DME: Shower chair, walker.    VNS: Cannot find accepting company.    Patient adamantly refuses SNF stating that she needs to return home to her pets. Psych consulted to determine capacity. If patient has capacity to make decisions we will need to ensure that she has a  in order to get into her home as the police who had been called had closed her home and her keys are inside.    Will continue to follow for additional discharge needs.    If patient is discharged prior to next notation, then this note serves as note for discharge by case management.    Electronically signed by Maria De Jesus Underwood RN on 1/23/2024 at 9:01 AM

## 2024-01-24 PROBLEM — F03.90 MAJOR NEUROCOGNITIVE DISORDER (HCC): Status: ACTIVE | Noted: 2024-01-24

## 2024-01-24 PROCEDURE — 6370000000 HC RX 637 (ALT 250 FOR IP): Performed by: INTERNAL MEDICINE

## 2024-01-24 PROCEDURE — 97535 SELF CARE MNGMENT TRAINING: CPT

## 2024-01-24 PROCEDURE — 97116 GAIT TRAINING THERAPY: CPT

## 2024-01-24 PROCEDURE — 1200000000 HC SEMI PRIVATE

## 2024-01-24 PROCEDURE — 6360000002 HC RX W HCPCS: Performed by: INTERNAL MEDICINE

## 2024-01-24 PROCEDURE — 99231 SBSQ HOSP IP/OBS SF/LOW 25: CPT | Performed by: INTERNAL MEDICINE

## 2024-01-24 PROCEDURE — 99232 SBSQ HOSP IP/OBS MODERATE 35: CPT | Performed by: PSYCHIATRY & NEUROLOGY

## 2024-01-24 RX ADMIN — HYDROCHLOROTHIAZIDE 12.5 MG: 25 TABLET ORAL at 08:52

## 2024-01-24 RX ADMIN — LISINOPRIL 40 MG: 20 TABLET ORAL at 08:52

## 2024-01-24 RX ADMIN — ENOXAPARIN SODIUM 40 MG: 100 INJECTION SUBCUTANEOUS at 08:52

## 2024-01-24 RX ADMIN — METOPROLOL TARTRATE 12.5 MG: 25 TABLET, FILM COATED ORAL at 08:52

## 2024-01-24 RX ADMIN — METOPROLOL TARTRATE 12.5 MG: 25 TABLET, FILM COATED ORAL at 21:36

## 2024-01-24 NOTE — ACP (ADVANCE CARE PLANNING)
..Advance Care Planning     Advance Care Planning Activator (Inpatient)  Conversation Note      Date of ACP Conversation: 1/24/2024     Conversation Conducted with: The patient has no biological family.  We are obtaining an appointed guardian for the patient as she lacks capacity.    Evans Benz has been contacted he is working on obtaining guardianship for the patient. See care coordination note 1/24/2024    ACP Activator: Vangie Allred RN          Health Care Decision Maker:     Current Designated Health Care Decision Maker:     Click here to complete Healthcare Decision Makers including section of the Healthcare Decision Maker Relationship (ie \"Primary\")      Care Preferences    Ventilation:  \"If you were in your present state of health and suddenly became very ill and were unable to breathe on your own, what would your preference be about the use of a ventilator (breathing machine) if it were available to you?\"      Would the patient desire the use of ventilator (breathing machine)?: no    \"If your health worsens and it becomes clear that your chance of recovery is unlikely, what would your preference be about the use of a ventilator (breathing machine) if it were available to you?\"     Would the patient desire the use of ventilator (breathing machine)?: No      Resuscitation  \"CPR works best to restart the heart when there is a sudden event, like a heart attack, in someone who is otherwise healthy. Unfortunately, CPR does not typically restart the heart for people who have serious health conditions or who are very sick.\"    \"In the event your heart stopped as a result of an underlying serious health condition, would you want attempts to be made to restart your heart (answer \"yes\" for attempt to resuscitate) or would you prefer a natural death (answer \"no\" for do not attempt to resuscitate)?\" no       [] Yes   [] No   Educated Patient / Decision Maker regarding differences between Advance Directives and

## 2024-01-24 NOTE — CARE COORDINATION
received a call from Yvonne jon [facility] and she is running this with her medical director and will call jayjayr back.    Landon from [facility] called and she advised they can accept patient.     Waiting on the other 2 referral decisions then will call Ed davis with list of who can accept patient based on his choices.    Electronically signed by Karissa Olguin RN on 1/24/2024 at 3:05 PM

## 2024-01-24 NOTE — CARE COORDINATION
Writer received a call from Landon from [facility] and advised that her  spoke with Ed Boyd about placement and he was happy to accept for her to go there.  Landon advised they will start the pre cert for patient today.    Electronically signed by Karissa Olguin RN on 1/24/2024 at 4:01 PM

## 2024-01-24 NOTE — CARE COORDINATION
Writer received completed Expert eval from Dr Shaikh.  Writer called  Evanscarlie Boyd [phone] and spoke to him about this patient.  Silvio  advised to send him copies of Expert eval and facesheet and he will work on guardianship for this patient. The earliest for court hearing is Next Tuesday or Wednesday.  He would like referrals sent to [facility], [facility] in [place] and [facility].  Then let him know which ones will accept her.     Electronically signed by Karissa Olguin RN on 1/24/2024 at 11:22 AM     Referrals faxed to [facility], [facility], [facility] and [facility].  Waiting on responses.    Electronically signed by Karissa Olguin RN on 1/24/2024 at 11:44 AM

## 2024-01-25 LAB
FOLATE SERPL-MCNC: >20 NG/ML (ref 4.8–24.2)
VIT B12 SERPL-MCNC: 204 PG/ML (ref 232–1245)

## 2024-01-25 PROCEDURE — 97110 THERAPEUTIC EXERCISES: CPT

## 2024-01-25 PROCEDURE — 97116 GAIT TRAINING THERAPY: CPT

## 2024-01-25 PROCEDURE — 82607 VITAMIN B-12: CPT

## 2024-01-25 PROCEDURE — 6370000000 HC RX 637 (ALT 250 FOR IP): Performed by: INTERNAL MEDICINE

## 2024-01-25 PROCEDURE — 6360000002 HC RX W HCPCS: Performed by: INTERNAL MEDICINE

## 2024-01-25 PROCEDURE — 36415 COLL VENOUS BLD VENIPUNCTURE: CPT

## 2024-01-25 PROCEDURE — 99232 SBSQ HOSP IP/OBS MODERATE 35: CPT | Performed by: INTERNAL MEDICINE

## 2024-01-25 PROCEDURE — 82746 ASSAY OF FOLIC ACID SERUM: CPT

## 2024-01-25 PROCEDURE — 99232 SBSQ HOSP IP/OBS MODERATE 35: CPT | Performed by: PSYCHIATRY & NEUROLOGY

## 2024-01-25 PROCEDURE — 1200000000 HC SEMI PRIVATE

## 2024-01-25 RX ORDER — UBIDECARENONE 75 MG
50 CAPSULE ORAL DAILY
Status: DISCONTINUED | OUTPATIENT
Start: 2024-01-25 | End: 2024-01-29

## 2024-01-25 RX ORDER — CYANOCOBALAMIN 1000 UG/ML
1000 INJECTION, SOLUTION INTRAMUSCULAR; SUBCUTANEOUS ONCE
Status: COMPLETED | OUTPATIENT
Start: 2024-01-25 | End: 2024-01-25

## 2024-01-25 RX ADMIN — HYDROCHLOROTHIAZIDE 12.5 MG: 25 TABLET ORAL at 08:00

## 2024-01-25 RX ADMIN — LISINOPRIL 40 MG: 20 TABLET ORAL at 08:00

## 2024-01-25 RX ADMIN — METOPROLOL TARTRATE 12.5 MG: 25 TABLET, FILM COATED ORAL at 21:01

## 2024-01-25 RX ADMIN — ENOXAPARIN SODIUM 40 MG: 100 INJECTION SUBCUTANEOUS at 08:00

## 2024-01-25 RX ADMIN — METOPROLOL TARTRATE 12.5 MG: 25 TABLET, FILM COATED ORAL at 08:00

## 2024-01-25 RX ADMIN — Medication 50 MCG: at 13:12

## 2024-01-25 RX ADMIN — CYANOCOBALAMIN 1000 MCG: 1000 INJECTION, SOLUTION INTRAMUSCULAR; SUBCUTANEOUS at 13:13

## 2024-01-25 NOTE — CARE COORDINATION
Writer received a message from Evans Boyd  advising Court has been confirmed for 2/1/24 at 11:00a.m. for legal guardianship    Electronically signed by Karissa Olguin RN on 1/25/2024 at 12:47 PM

## 2024-01-25 NOTE — CARE COORDINATION
Pre cert for [facility] has been approved.   However court hearing is Tuesday at 11;00, will need [facility] to restart pre cert to admit on Tuesday.    Electronically signed by Karissa Olguin RN on 1/25/2024 at 4:43 PM     Correction to note above.  Court date is Thursday 2/1/24 at 11:00a.m.    Electronically signed by Karissa Olguin RN on 1/26/2024 at 12:37 PM

## 2024-01-26 LAB
ANION GAP SERPL CALCULATED.3IONS-SCNC: 11 MMOL/L (ref 9–17)
BUN SERPL-MCNC: 32 MG/DL (ref 8–23)
CALCIUM SERPL-MCNC: 9 MG/DL (ref 8.6–10.4)
CHLORIDE SERPL-SCNC: 102 MMOL/L (ref 98–107)
CO2 SERPL-SCNC: 29 MMOL/L (ref 20–31)
CREAT SERPL-MCNC: 0.8 MG/DL (ref 0.5–0.9)
GFR SERPL CREATININE-BSD FRML MDRD: >60 ML/MIN/1.73M2
GLUCOSE SERPL-MCNC: 94 MG/DL (ref 70–99)
POTASSIUM SERPL-SCNC: 4.2 MMOL/L (ref 3.7–5.3)
SODIUM SERPL-SCNC: 142 MMOL/L (ref 135–144)

## 2024-01-26 PROCEDURE — 36415 COLL VENOUS BLD VENIPUNCTURE: CPT

## 2024-01-26 PROCEDURE — 99232 SBSQ HOSP IP/OBS MODERATE 35: CPT | Performed by: PSYCHIATRY & NEUROLOGY

## 2024-01-26 PROCEDURE — 1200000000 HC SEMI PRIVATE

## 2024-01-26 PROCEDURE — 97110 THERAPEUTIC EXERCISES: CPT

## 2024-01-26 PROCEDURE — 97530 THERAPEUTIC ACTIVITIES: CPT

## 2024-01-26 PROCEDURE — 6360000002 HC RX W HCPCS: Performed by: INTERNAL MEDICINE

## 2024-01-26 PROCEDURE — 6370000000 HC RX 637 (ALT 250 FOR IP): Performed by: INTERNAL MEDICINE

## 2024-01-26 PROCEDURE — 80048 BASIC METABOLIC PNL TOTAL CA: CPT

## 2024-01-26 PROCEDURE — 99232 SBSQ HOSP IP/OBS MODERATE 35: CPT | Performed by: INTERNAL MEDICINE

## 2024-01-26 PROCEDURE — 97116 GAIT TRAINING THERAPY: CPT

## 2024-01-26 RX ADMIN — LISINOPRIL 40 MG: 20 TABLET ORAL at 09:17

## 2024-01-26 RX ADMIN — METOPROLOL TARTRATE 12.5 MG: 25 TABLET, FILM COATED ORAL at 09:17

## 2024-01-26 RX ADMIN — HYDROCHLOROTHIAZIDE 12.5 MG: 25 TABLET ORAL at 09:17

## 2024-01-26 RX ADMIN — ENOXAPARIN SODIUM 40 MG: 100 INJECTION SUBCUTANEOUS at 09:17

## 2024-01-26 RX ADMIN — METOPROLOL TARTRATE 12.5 MG: 25 TABLET, FILM COATED ORAL at 19:57

## 2024-01-26 RX ADMIN — Medication 50 MCG: at 09:20

## 2024-01-26 NOTE — CARE COORDINATION
Writer is following for potential discharge plans. Per RN CM lead Mimi notation, authorization was approved for [facility] and will need to be restarted.    Pt legal guardianship hearing will be Thursday 2/1/24.  Electronically signed by LEONEL Yuen on 1/26/2024 at 12:09 PM

## 2024-01-27 PROCEDURE — 6370000000 HC RX 637 (ALT 250 FOR IP): Performed by: INTERNAL MEDICINE

## 2024-01-27 PROCEDURE — 97116 GAIT TRAINING THERAPY: CPT

## 2024-01-27 PROCEDURE — 99232 SBSQ HOSP IP/OBS MODERATE 35: CPT | Performed by: INTERNAL MEDICINE

## 2024-01-27 PROCEDURE — 1200000000 HC SEMI PRIVATE

## 2024-01-27 PROCEDURE — 6360000002 HC RX W HCPCS: Performed by: INTERNAL MEDICINE

## 2024-01-27 PROCEDURE — 97110 THERAPEUTIC EXERCISES: CPT

## 2024-01-27 RX ADMIN — LISINOPRIL 40 MG: 20 TABLET ORAL at 09:47

## 2024-01-27 RX ADMIN — METOPROLOL TARTRATE 12.5 MG: 25 TABLET, FILM COATED ORAL at 09:47

## 2024-01-27 RX ADMIN — ENOXAPARIN SODIUM 40 MG: 100 INJECTION SUBCUTANEOUS at 09:47

## 2024-01-27 RX ADMIN — METOPROLOL TARTRATE 12.5 MG: 25 TABLET, FILM COATED ORAL at 20:40

## 2024-01-27 RX ADMIN — Medication 50 MCG: at 09:47

## 2024-01-27 RX ADMIN — HYDROCHLOROTHIAZIDE 12.5 MG: 25 TABLET ORAL at 09:47

## 2024-01-27 NOTE — CARE COORDINATION
ONGOING DISCHARGE PLANNING NOTE:    Writer reviewed LSW notes, and discharge plan is to go to [facility] once guardianship hearing has been completed; scheduled for 2/1/24.     Electronically signed by Maria De Jesus Underwood RN on 1/27/2024 at 7:53 AM

## 2024-01-28 PROCEDURE — 6370000000 HC RX 637 (ALT 250 FOR IP): Performed by: INTERNAL MEDICINE

## 2024-01-28 PROCEDURE — 1200000000 HC SEMI PRIVATE

## 2024-01-28 PROCEDURE — 6360000002 HC RX W HCPCS: Performed by: INTERNAL MEDICINE

## 2024-01-28 PROCEDURE — 99232 SBSQ HOSP IP/OBS MODERATE 35: CPT | Performed by: INTERNAL MEDICINE

## 2024-01-28 RX ADMIN — LISINOPRIL 40 MG: 20 TABLET ORAL at 07:13

## 2024-01-28 RX ADMIN — Medication 50 MCG: at 08:21

## 2024-01-28 RX ADMIN — HYDROCHLOROTHIAZIDE 12.5 MG: 25 TABLET ORAL at 07:13

## 2024-01-28 RX ADMIN — METOPROLOL TARTRATE 12.5 MG: 25 TABLET, FILM COATED ORAL at 20:14

## 2024-01-28 RX ADMIN — METOPROLOL TARTRATE 12.5 MG: 25 TABLET, FILM COATED ORAL at 07:27

## 2024-01-28 RX ADMIN — ENOXAPARIN SODIUM 40 MG: 100 INJECTION SUBCUTANEOUS at 07:13

## 2024-01-28 NOTE — CARE COORDINATION
ONGOING DISCHARGE PLANNING NOTE:     Writer reviewed LSW notes, and discharge plan is to go to [facility] once guardianship hearing has been completed; scheduled for 2/1/24.    Patient reports that she would like to go to a facility in [place] near friends. This writer will update LSW to see if there are long-term placement opportunities at one of these facilities.    Electronically signed by Maria De Jesus Underwood RN on 1/28/2024 at 12:47 PM

## 2024-01-29 PROCEDURE — 99232 SBSQ HOSP IP/OBS MODERATE 35: CPT | Performed by: PSYCHIATRY & NEUROLOGY

## 2024-01-29 PROCEDURE — 99232 SBSQ HOSP IP/OBS MODERATE 35: CPT | Performed by: INTERNAL MEDICINE

## 2024-01-29 PROCEDURE — 1200000000 HC SEMI PRIVATE

## 2024-01-29 PROCEDURE — 6370000000 HC RX 637 (ALT 250 FOR IP): Performed by: INTERNAL MEDICINE

## 2024-01-29 PROCEDURE — 6360000002 HC RX W HCPCS: Performed by: INTERNAL MEDICINE

## 2024-01-29 RX ORDER — RIVASTIGMINE 4.6 MG/24H
1 PATCH, EXTENDED RELEASE TRANSDERMAL DAILY
Status: DISCONTINUED | OUTPATIENT
Start: 2024-01-30 | End: 2024-02-07 | Stop reason: HOSPADM

## 2024-01-29 RX ORDER — UBIDECARENONE 75 MG
100 CAPSULE ORAL DAILY
Status: DISCONTINUED | OUTPATIENT
Start: 2024-01-30 | End: 2024-02-07 | Stop reason: HOSPADM

## 2024-01-29 RX ADMIN — METOPROLOL TARTRATE 12.5 MG: 25 TABLET, FILM COATED ORAL at 20:43

## 2024-01-29 RX ADMIN — Medication 50 MCG: at 09:50

## 2024-01-29 RX ADMIN — LISINOPRIL 40 MG: 20 TABLET ORAL at 09:50

## 2024-01-29 RX ADMIN — HYDROCHLOROTHIAZIDE 12.5 MG: 25 TABLET ORAL at 09:50

## 2024-01-29 RX ADMIN — ENOXAPARIN SODIUM 40 MG: 100 INJECTION SUBCUTANEOUS at 09:51

## 2024-01-29 RX ADMIN — METOPROLOL TARTRATE 12.5 MG: 25 TABLET, FILM COATED ORAL at 09:50

## 2024-01-29 NOTE — CARE COORDINATION
Writer is following for potential discharge plans. Per WYATT LAMBERT lead Mimi notation, authorization was approved for [facility] and will need to be restarted.     Pt legal guardianship hearing will be Thursday 2/1/24.    Per WYATT Pretty notation, pt would like to go to a facility in [place] area. WYATT Pretty informed RN PETRA lead Mimi of this as she has contact with Ed Boyd who is obtaining guardianship.  Electronically signed by LEONEL Yuen on 1/29/2024 at 11:18 AM

## 2024-01-30 PROCEDURE — 6370000000 HC RX 637 (ALT 250 FOR IP): Performed by: INTERNAL MEDICINE

## 2024-01-30 PROCEDURE — 99232 SBSQ HOSP IP/OBS MODERATE 35: CPT | Performed by: PSYCHIATRY & NEUROLOGY

## 2024-01-30 PROCEDURE — 97530 THERAPEUTIC ACTIVITIES: CPT

## 2024-01-30 PROCEDURE — 6360000002 HC RX W HCPCS: Performed by: INTERNAL MEDICINE

## 2024-01-30 PROCEDURE — 99232 SBSQ HOSP IP/OBS MODERATE 35: CPT | Performed by: INTERNAL MEDICINE

## 2024-01-30 PROCEDURE — 1200000000 HC SEMI PRIVATE

## 2024-01-30 PROCEDURE — 97110 THERAPEUTIC EXERCISES: CPT

## 2024-01-30 PROCEDURE — 6370000000 HC RX 637 (ALT 250 FOR IP): Performed by: PSYCHIATRY & NEUROLOGY

## 2024-01-30 RX ADMIN — Medication 100 MCG: at 08:22

## 2024-01-30 RX ADMIN — ENOXAPARIN SODIUM 40 MG: 100 INJECTION SUBCUTANEOUS at 08:22

## 2024-01-30 RX ADMIN — METOPROLOL TARTRATE 12.5 MG: 25 TABLET, FILM COATED ORAL at 08:21

## 2024-01-30 RX ADMIN — METOPROLOL TARTRATE 12.5 MG: 25 TABLET, FILM COATED ORAL at 21:58

## 2024-01-30 RX ADMIN — LISINOPRIL 40 MG: 20 TABLET ORAL at 08:21

## 2024-01-30 RX ADMIN — HYDROCHLOROTHIAZIDE 12.5 MG: 25 TABLET ORAL at 08:22

## 2024-01-30 NOTE — CARE COORDINATION
PASRR started for admission to [facility].  HENS ID: [id number]  Electronically signed by LEONEL Yuen on 1/30/2024 at 3:30 PM

## 2024-01-30 NOTE — CARE COORDINATION
Writer is following for potential discharge to [facility]. Pt will need authorization to admit to facility.    Pt legal guardianship hearing will be held Thursday 2/1/24.  Electronically signed by LEONEL Yuen on 1/30/2024 at 10:37 AM

## 2024-01-31 LAB
ANION GAP SERPL CALCULATED.3IONS-SCNC: 9 MMOL/L (ref 9–17)
BASOPHILS # BLD: 0.1 K/UL (ref 0–0.2)
BASOPHILS NFR BLD: 1 % (ref 0–2)
BUN SERPL-MCNC: 43 MG/DL (ref 8–23)
CALCIUM SERPL-MCNC: 8.9 MG/DL (ref 8.6–10.4)
CHLORIDE SERPL-SCNC: 102 MMOL/L (ref 98–107)
CO2 SERPL-SCNC: 29 MMOL/L (ref 20–31)
CREAT SERPL-MCNC: 0.7 MG/DL (ref 0.5–0.9)
EOSINOPHIL # BLD: 0.2 K/UL (ref 0–0.4)
EOSINOPHILS RELATIVE PERCENT: 3 % (ref 0–4)
ERYTHROCYTE [DISTWIDTH] IN BLOOD BY AUTOMATED COUNT: 12.6 % (ref 11.5–14.9)
GFR SERPL CREATININE-BSD FRML MDRD: >60 ML/MIN/1.73M2
GLUCOSE SERPL-MCNC: 127 MG/DL (ref 70–99)
HCT VFR BLD AUTO: 31.8 % (ref 36–46)
HGB BLD-MCNC: 10.5 G/DL (ref 12–16)
LYMPHOCYTES NFR BLD: 0.9 K/UL (ref 1–4.8)
LYMPHOCYTES RELATIVE PERCENT: 16 % (ref 24–44)
MCH RBC QN AUTO: 29.6 PG (ref 26–34)
MCHC RBC AUTO-ENTMCNC: 33 G/DL (ref 31–37)
MCV RBC AUTO: 89.8 FL (ref 80–100)
MONOCYTES NFR BLD: 0.8 K/UL (ref 0.1–1.3)
MONOCYTES NFR BLD: 14 % (ref 1–7)
NEUTROPHILS NFR BLD: 66 % (ref 36–66)
NEUTS SEG NFR BLD: 3.7 K/UL (ref 1.3–9.1)
PLATELET # BLD AUTO: 291 K/UL (ref 150–450)
PMV BLD AUTO: 7.8 FL (ref 6–12)
POTASSIUM SERPL-SCNC: 4.4 MMOL/L (ref 3.7–5.3)
RBC # BLD AUTO: 3.54 M/UL (ref 4–5.2)
SODIUM SERPL-SCNC: 140 MMOL/L (ref 135–144)
WBC OTHER # BLD: 5.7 K/UL (ref 3.5–11)

## 2024-01-31 PROCEDURE — 1200000000 HC SEMI PRIVATE

## 2024-01-31 PROCEDURE — 6370000000 HC RX 637 (ALT 250 FOR IP): Performed by: INTERNAL MEDICINE

## 2024-01-31 PROCEDURE — 99232 SBSQ HOSP IP/OBS MODERATE 35: CPT | Performed by: PSYCHIATRY & NEUROLOGY

## 2024-01-31 PROCEDURE — 80048 BASIC METABOLIC PNL TOTAL CA: CPT

## 2024-01-31 PROCEDURE — 6360000002 HC RX W HCPCS: Performed by: INTERNAL MEDICINE

## 2024-01-31 PROCEDURE — 36415 COLL VENOUS BLD VENIPUNCTURE: CPT

## 2024-01-31 PROCEDURE — 85025 COMPLETE CBC W/AUTO DIFF WBC: CPT

## 2024-01-31 PROCEDURE — 6370000000 HC RX 637 (ALT 250 FOR IP): Performed by: PSYCHIATRY & NEUROLOGY

## 2024-01-31 PROCEDURE — 99232 SBSQ HOSP IP/OBS MODERATE 35: CPT | Performed by: INTERNAL MEDICINE

## 2024-01-31 RX ADMIN — ENOXAPARIN SODIUM 40 MG: 100 INJECTION SUBCUTANEOUS at 08:51

## 2024-01-31 RX ADMIN — Medication 100 MCG: at 08:51

## 2024-01-31 RX ADMIN — METOPROLOL TARTRATE 12.5 MG: 25 TABLET, FILM COATED ORAL at 09:01

## 2024-01-31 RX ADMIN — METOPROLOL TARTRATE 12.5 MG: 25 TABLET, FILM COATED ORAL at 23:16

## 2024-01-31 NOTE — CARE COORDINATION
Writer is following for potential discharge to [facility]. Pt will need authorization to admit to facility.    Pt legal guardianship hearing will be held Thursday 2/1/24.  Electronically signed by LEONEL Yuen on 1/31/2024 at 12:42 PM

## 2024-02-01 LAB
ANION GAP SERPL CALCULATED.3IONS-SCNC: 9 MMOL/L (ref 9–17)
BUN SERPL-MCNC: 46 MG/DL (ref 8–23)
CALCIUM SERPL-MCNC: 8.7 MG/DL (ref 8.6–10.4)
CHLORIDE SERPL-SCNC: 104 MMOL/L (ref 98–107)
CO2 SERPL-SCNC: 28 MMOL/L (ref 20–31)
CREAT SERPL-MCNC: 0.8 MG/DL (ref 0.5–0.9)
GFR SERPL CREATININE-BSD FRML MDRD: >60 ML/MIN/1.73M2
GLUCOSE SERPL-MCNC: 99 MG/DL (ref 70–99)
POTASSIUM SERPL-SCNC: 5 MMOL/L (ref 3.7–5.3)
SODIUM SERPL-SCNC: 141 MMOL/L (ref 135–144)

## 2024-02-01 PROCEDURE — 6360000002 HC RX W HCPCS: Performed by: INTERNAL MEDICINE

## 2024-02-01 PROCEDURE — 99232 SBSQ HOSP IP/OBS MODERATE 35: CPT | Performed by: PSYCHIATRY & NEUROLOGY

## 2024-02-01 PROCEDURE — 6370000000 HC RX 637 (ALT 250 FOR IP): Performed by: INTERNAL MEDICINE

## 2024-02-01 PROCEDURE — 6370000000 HC RX 637 (ALT 250 FOR IP): Performed by: PSYCHIATRY & NEUROLOGY

## 2024-02-01 PROCEDURE — 80048 BASIC METABOLIC PNL TOTAL CA: CPT

## 2024-02-01 PROCEDURE — 97110 THERAPEUTIC EXERCISES: CPT

## 2024-02-01 PROCEDURE — 97530 THERAPEUTIC ACTIVITIES: CPT

## 2024-02-01 PROCEDURE — 97116 GAIT TRAINING THERAPY: CPT

## 2024-02-01 PROCEDURE — 1200000000 HC SEMI PRIVATE

## 2024-02-01 PROCEDURE — 36415 COLL VENOUS BLD VENIPUNCTURE: CPT

## 2024-02-01 PROCEDURE — 99232 SBSQ HOSP IP/OBS MODERATE 35: CPT | Performed by: INTERNAL MEDICINE

## 2024-02-01 RX ORDER — RIVASTIGMINE 4.6 MG/24H
1 PATCH, EXTENDED RELEASE TRANSDERMAL DAILY
Qty: 30 PATCH | Refills: 3 | Status: SHIPPED | OUTPATIENT
Start: 2024-02-02

## 2024-02-01 RX ORDER — LANOLIN ALCOHOL/MO/W.PET/CERES
3 CREAM (GRAM) TOPICAL NIGHTLY PRN
Qty: 30 TABLET | Refills: 3 | Status: SHIPPED | OUTPATIENT
Start: 2024-02-01

## 2024-02-01 RX ADMIN — Medication 100 MCG: at 09:43

## 2024-02-01 RX ADMIN — ENOXAPARIN SODIUM 40 MG: 100 INJECTION SUBCUTANEOUS at 09:45

## 2024-02-01 RX ADMIN — METOPROLOL TARTRATE 12.5 MG: 25 TABLET, FILM COATED ORAL at 20:22

## 2024-02-01 ASSESSMENT — PAIN SCALES - GENERAL
PAINLEVEL_OUTOF10: 0
PAINLEVEL_OUTOF10: 0

## 2024-02-01 NOTE — CARE COORDINATION
Writer went to room and Neighbor amish at bedside.  She completed the MPOA paperwork with patient today and was having patient write her a check for cat food while writer came into room.  Amish advised she was cathy to get into her home through a living room window and give cats food and water.  She was able to find her house keys and her purse and brought up tot [facility]. She says Scarlett had her car keys with her this whole time, but hasn't driven in 1 month.  Amish says she takes Scarlett to the grocery store,  she has been her neighbor/friend for 30 years.  Amish advised she is waiting on a call from the courts regarding patients legal guardianship case.    Electronically signed by Karissa Olguin RN on 2/1/2024 at 11:28 AM     Writer joined a multiple call conference call with  Evans Boyd, the  from [facility] courts, Amish, patients neighbor and Patient.   The  spoke with Amish and Scarlett and explained the [facility] and advised that the paperwork she was working on is not valid, and the  explained why.  Amish said she understood.  The  granted Legal Guardianship to Evans Boyd and explained his duties to Dr Boyd , Amish and Scarlett.  Amish did advocate for Scarlett and asked if scarlett could go to [facility] re[facility] as this was closer to her home so she could visit.  The  and Mr Boyd were in agreeable with that request.   gave permission for  [facility] to be notified of Scarlett to go to that rehab facility.    Referral has been faxed to [facility] and I am waiting on call back for acceptance.    Electronically signed by Karissa Olguin RN on 2/1/2024 at 2:14 PM     Writer raheem received Legal Guardianship approved paperwork and will get this scanned into her chart.    Electronically signed by Karissa Olguin RN on 2/1/2024 at 2:15 PM

## 2024-02-01 NOTE — CARE COORDINATION
Mary from [facility] [phone] called and advised they are sending her off for review and will let me know if they can accept her.      Electronically signed by Karissa Olguin RN on 2/1/2024 at 4:47 PM

## 2024-02-02 PROCEDURE — 97530 THERAPEUTIC ACTIVITIES: CPT

## 2024-02-02 PROCEDURE — 6370000000 HC RX 637 (ALT 250 FOR IP): Performed by: PSYCHIATRY & NEUROLOGY

## 2024-02-02 PROCEDURE — 6370000000 HC RX 637 (ALT 250 FOR IP): Performed by: INTERNAL MEDICINE

## 2024-02-02 PROCEDURE — 97110 THERAPEUTIC EXERCISES: CPT

## 2024-02-02 PROCEDURE — 6360000002 HC RX W HCPCS: Performed by: INTERNAL MEDICINE

## 2024-02-02 PROCEDURE — 99232 SBSQ HOSP IP/OBS MODERATE 35: CPT | Performed by: INTERNAL MEDICINE

## 2024-02-02 PROCEDURE — 1200000000 HC SEMI PRIVATE

## 2024-02-02 PROCEDURE — 99232 SBSQ HOSP IP/OBS MODERATE 35: CPT | Performed by: PSYCHIATRY & NEUROLOGY

## 2024-02-02 RX ADMIN — ENOXAPARIN SODIUM 40 MG: 100 INJECTION SUBCUTANEOUS at 09:13

## 2024-02-02 RX ADMIN — METOPROLOL TARTRATE 12.5 MG: 25 TABLET, FILM COATED ORAL at 20:51

## 2024-02-02 RX ADMIN — Medication 100 MCG: at 09:13

## 2024-02-02 RX ADMIN — METOPROLOL TARTRATE 12.5 MG: 25 TABLET, FILM COATED ORAL at 09:12

## 2024-02-02 NOTE — CARE COORDINATION
Writer placed call to Mary at [facility] regarding referral.  Mary states she received pt social security number late 2/1 and is in review.  Electronically signed by LEONEL Yuen on 2/2/2024 at 9:13 AM    Writer received message from Mary confirming pt has been accepted.   Authorization has started for this pt.    Writer placed message to Emily with [facility] to confirm pt is going to admit to [facility].  Electronically signed by LEONEL Yuen on 2/2/2024 at 11:55 AM    Writer met with Maribel with [facility] regarding on site visit with pt. Writer agreeable to visit.  Electronically signed by LEONEL Yuen on 2/2/2024 at 4:22 PM

## 2024-02-02 NOTE — CARE COORDINATION
Writer received call from [facility] representative regarding authorization started for this pt stating no determination has been made as of yet, that pt does look high functioning.    Writer discussed pt living situation prior to hospital admission and representative has this information as well.    Writer provided contact information for any further questions.  Electronically signed by LEONEL Yuen on 2/2/2024 at 4:23 PM

## 2024-02-03 PROCEDURE — 6370000000 HC RX 637 (ALT 250 FOR IP): Performed by: INTERNAL MEDICINE

## 2024-02-03 PROCEDURE — 6360000002 HC RX W HCPCS: Performed by: INTERNAL MEDICINE

## 2024-02-03 PROCEDURE — 99232 SBSQ HOSP IP/OBS MODERATE 35: CPT | Performed by: INTERNAL MEDICINE

## 2024-02-03 PROCEDURE — 6370000000 HC RX 637 (ALT 250 FOR IP): Performed by: PSYCHIATRY & NEUROLOGY

## 2024-02-03 PROCEDURE — 1200000000 HC SEMI PRIVATE

## 2024-02-03 RX ADMIN — METOPROLOL TARTRATE 12.5 MG: 25 TABLET, FILM COATED ORAL at 08:18

## 2024-02-03 RX ADMIN — Medication 100 MCG: at 08:18

## 2024-02-03 RX ADMIN — ENOXAPARIN SODIUM 40 MG: 100 INJECTION SUBCUTANEOUS at 08:18

## 2024-02-03 NOTE — CARE COORDINATION
ONGOING DISCHARGE PLAN:    Writer was notified, by [facility], that a Peer to Peer has been offered.     Writer informed Charge Nurse, Iza, who spoke to Dr. Shaikh & she would like to do this on Monday.    Writer did inform her that this NEEDS TO BE DONE MY 12 ON MONDAY. Dr. Shaikh, is aware.     Will continue to follow for additional discharge needs.    If patient is discharged prior to next notation, then this note serves as note for discharge by case management.    Electronically signed by Thi Poe RN on 2/3/2024 at 11:18 AM

## 2024-02-04 PROCEDURE — 6370000000 HC RX 637 (ALT 250 FOR IP): Performed by: PSYCHIATRY & NEUROLOGY

## 2024-02-04 PROCEDURE — 6370000000 HC RX 637 (ALT 250 FOR IP): Performed by: INTERNAL MEDICINE

## 2024-02-04 PROCEDURE — 6360000002 HC RX W HCPCS: Performed by: INTERNAL MEDICINE

## 2024-02-04 PROCEDURE — 97110 THERAPEUTIC EXERCISES: CPT

## 2024-02-04 PROCEDURE — 97116 GAIT TRAINING THERAPY: CPT

## 2024-02-04 PROCEDURE — 99232 SBSQ HOSP IP/OBS MODERATE 35: CPT | Performed by: INTERNAL MEDICINE

## 2024-02-04 PROCEDURE — 1200000000 HC SEMI PRIVATE

## 2024-02-04 RX ADMIN — METOPROLOL TARTRATE 12.5 MG: 25 TABLET, FILM COATED ORAL at 13:48

## 2024-02-04 RX ADMIN — METOPROLOL TARTRATE 12.5 MG: 25 TABLET, FILM COATED ORAL at 20:56

## 2024-02-04 RX ADMIN — ENOXAPARIN SODIUM 40 MG: 100 INJECTION SUBCUTANEOUS at 08:43

## 2024-02-04 RX ADMIN — Medication 100 MCG: at 08:43

## 2024-02-04 NOTE — CARE COORDINATION
Writer received call from Iza, [facility], from [facility]. Informed of Denial R/T rec lower level of care.     Appeals Number is [phone] #3.

## 2024-02-04 NOTE — CARE COORDINATION
was notified, by Margot & Leila, From [facility], that they are requesting a Peer to Peer.     Writer Called, [phone] #[phone] & Spoke to Ronit.      attempted to set up a time for Dr. Shaikh to call & Per Ronit, the Doctor needs to do this & would not let writer set this up.     Writer did speak to Dr. Shaikh, gave her the above Number to call, along w/ Pt's , Insurance ID & SS to call.     Peer to Peer needs to be done by Monday at 12 PM.     Writer will follow.

## 2024-02-04 NOTE — CARE COORDINATION
ONGOING DISCHARGE PLANNING NOTE:    Writer reviewed LSW notes, and discharge plan is to follow for SNF, [facility].     Peer to Peer needs to be done by Monday 2/5, at 12pm.    Dr. Shaikh, notified, has all information.      Electronically signed by Thi Poe RN on 2/4/2024 at 9:34 AM        111

## 2024-02-05 LAB
ANION GAP SERPL CALCULATED.3IONS-SCNC: 9 MMOL/L (ref 9–17)
BUN SERPL-MCNC: 37 MG/DL (ref 8–23)
CALCIUM SERPL-MCNC: 9.1 MG/DL (ref 8.6–10.4)
CHLORIDE SERPL-SCNC: 104 MMOL/L (ref 98–107)
CO2 SERPL-SCNC: 27 MMOL/L (ref 20–31)
CREAT SERPL-MCNC: 0.5 MG/DL (ref 0.5–0.9)
GFR SERPL CREATININE-BSD FRML MDRD: >60 ML/MIN/1.73M2
GLUCOSE SERPL-MCNC: 102 MG/DL (ref 70–99)
POTASSIUM SERPL-SCNC: 4.2 MMOL/L (ref 3.7–5.3)
SODIUM SERPL-SCNC: 140 MMOL/L (ref 135–144)

## 2024-02-05 PROCEDURE — 1200000000 HC SEMI PRIVATE

## 2024-02-05 PROCEDURE — 6370000000 HC RX 637 (ALT 250 FOR IP): Performed by: INTERNAL MEDICINE

## 2024-02-05 PROCEDURE — 6360000002 HC RX W HCPCS: Performed by: INTERNAL MEDICINE

## 2024-02-05 PROCEDURE — 36415 COLL VENOUS BLD VENIPUNCTURE: CPT

## 2024-02-05 PROCEDURE — 6370000000 HC RX 637 (ALT 250 FOR IP): Performed by: PSYCHIATRY & NEUROLOGY

## 2024-02-05 PROCEDURE — 99232 SBSQ HOSP IP/OBS MODERATE 35: CPT | Performed by: PSYCHIATRY & NEUROLOGY

## 2024-02-05 PROCEDURE — 80048 BASIC METABOLIC PNL TOTAL CA: CPT

## 2024-02-05 PROCEDURE — 99232 SBSQ HOSP IP/OBS MODERATE 35: CPT | Performed by: INTERNAL MEDICINE

## 2024-02-05 RX ADMIN — ENOXAPARIN SODIUM 40 MG: 100 INJECTION SUBCUTANEOUS at 07:54

## 2024-02-05 RX ADMIN — METOPROLOL TARTRATE 12.5 MG: 25 TABLET, FILM COATED ORAL at 07:54

## 2024-02-05 RX ADMIN — Medication 100 MCG: at 07:54

## 2024-02-05 RX ADMIN — METOPROLOL TARTRATE 12.5 MG: 25 TABLET, FILM COATED ORAL at 19:31

## 2024-02-05 NOTE — CARE COORDINATION
Writer is spoke to Haritha with [employer] expedited appeals to create appeal.    Authorization reference #[id number]  Physician can call [phone] option 3.  Electronically signed by LEONEL Yuen on 2/5/2024 at 11:45 AM    Writer met with Dr. Sun regarding call. Dr. Sun wrote a supporting statement. Writer faxed this statement and additional clinicals to [phone].  Electronically signed by LEONEL Yuen on 2/5/2024 at 2:19 PM

## 2024-02-06 PROCEDURE — 99232 SBSQ HOSP IP/OBS MODERATE 35: CPT | Performed by: INTERNAL MEDICINE

## 2024-02-06 PROCEDURE — 97116 GAIT TRAINING THERAPY: CPT

## 2024-02-06 PROCEDURE — 1200000000 HC SEMI PRIVATE

## 2024-02-06 PROCEDURE — 6370000000 HC RX 637 (ALT 250 FOR IP): Performed by: PSYCHIATRY & NEUROLOGY

## 2024-02-06 PROCEDURE — 6370000000 HC RX 637 (ALT 250 FOR IP): Performed by: INTERNAL MEDICINE

## 2024-02-06 PROCEDURE — 6360000002 HC RX W HCPCS: Performed by: INTERNAL MEDICINE

## 2024-02-06 PROCEDURE — 97110 THERAPEUTIC EXERCISES: CPT

## 2024-02-06 RX ADMIN — ENOXAPARIN SODIUM 40 MG: 100 INJECTION SUBCUTANEOUS at 08:31

## 2024-02-06 RX ADMIN — METOPROLOL TARTRATE 12.5 MG: 25 TABLET, FILM COATED ORAL at 08:33

## 2024-02-06 RX ADMIN — Medication 100 MCG: at 08:33

## 2024-02-06 RX ADMIN — METOPROLOL TARTRATE 12.5 MG: 25 TABLET, FILM COATED ORAL at 21:25

## 2024-02-06 NOTE — CARE COORDINATION
Writer is following for potential discharge to [facility].  Expedited appeal process was started 2/5. Insurance has 72 hours to make determination.  Electronically signed by LEONEL Yuen on 2/6/2024 at 1:40 PM

## 2024-02-06 NOTE — PLAN OF CARE
Problem: ABCDS Injury Assessment  Goal: Absence of physical injury  1/29/2024 0452 by Josemanuel Richardson, RN  Outcome: Progressing     Problem: Safety - Adult  Goal: Free from fall injury  1/29/2024 0452 by Josemanuel Richardson, RN  Outcome: Progressing  Patient remains free of incidence/ injury. Bed remains in low position. Call light within reach. Side rails up x2.    
  Problem: Discharge Planning  Goal: Discharge to home or other facility with appropriate resources  Outcome: Progressing  Flowsheets (Taken 1/21/2024 2002 by Ninfa Reyes RN)  Discharge to home or other facility with appropriate resources:   Identify barriers to discharge with patient and caregiver   Arrange for needed discharge resources and transportation as appropriate   Identify discharge learning needs (meds, wound care, etc)  Note: Patient refusing SNF. Patient wants to go home. Living conditions are not appropriate for living.      Problem: Safety - Adult  Goal: Free from fall injury  Outcome: Progressing  Note: Pt assessed as a fall risk this shift. Remains free from falls and accidental injury at this time. Fall precautions in place, including falling star sign and fall risk band on pt. Floor free from obstacles, and bed is locked and in lowest position. Adequate lighting provided.  Pt encouraged to call before getting OOB for any need.  Bed alarm activated. Chair alarm added. Patient educated on not to disconnect alarm as this is for her safety.      
  Problem: Safety - Adult  Goal: Free from fall injury  1/26/2024 1514 by Carolina Kearney RN  Outcome: Progressing, Patient remains free of incidence/ injury. Bed remains in low position. Side rails up x2.       Problem: Skin/Tissue Integrity  Goal: Absence of new skin breakdown  Description: 1.  Monitor for areas of redness and/or skin breakdown  2.  Assess vascular access sites hourly  3.  Every 4-6 hours minimum:  Change oxygen saturation probe site  4.  Every 4-6 hours:  If on nasal continuous positive airway pressure, respiratory therapy assess nares and determine need for appliance change or resting period.  1/26/2024 1514 by Carolina Kearney, RN  Outcome: Progressing, Pt remain free of skin breakdown. Educated on the importance of turning and alternating position.     Problem: Pain  Goal: Verbalizes/displays adequate comfort level or baseline comfort level  1/26/2024 1514 by Carolina Kearney, RN  Outcome: Progressing, Pt educated on non-pharmacological pain interventions. PRN pain medications administered.        
  Problem: Skin/Tissue Integrity  Goal: Absence of new skin breakdown  Description: 1.  Monitor for areas of redness and/or skin breakdown  2.  Assess vascular access sites hourly  3.  Every 4-6 hours minimum:  Change oxygen saturation probe site  4.  Every 4-6 hours:  If on nasal continuous positive airway pressure, respiratory therapy assess nares and determine need for appliance change or resting period.  1/19/2024 0444 by Dario Ruiz, RN  Outcome: Progressing  Note: Skin assessment as charted, assist patient with Q2 and PRN turning.     Problem: Discharge Planning  Goal: Discharge to home or other facility with appropriate resources  Outcome: Progressing  Flowsheets (Taken 1/19/2024 1739)  Discharge to home or other facility with appropriate resources:   Identify barriers to discharge with patient and caregiver   Identify discharge learning needs (meds, wound care, etc)  Note: Working with patient and neighbors to establish plan of care once patient returns home.     Problem: Safety - Adult  Goal: Free from fall injury  1/19/2024 1739 by Josephine Tucker, RN  Outcome: Progressing  Note: Patient remains free from injury  1/19/2024 0444 by Dario Ruiz, RN  Note: No falls noted this shift, bed in lowest position, call light within reach, side rails up X's 2.      
  Problem: Skin/Tissue Integrity  Goal: Absence of new skin breakdown  Description: 1.  Monitor for areas of redness and/or skin breakdown  2.  Assess vascular access sites hourly  3.  Every 4-6 hours minimum:  Change oxygen saturation probe site  4.  Every 4-6 hours:  If on nasal continuous positive airway pressure, respiratory therapy assess nares and determine need for appliance change or resting period.  1/19/2024 0444 by Dario Ruiz, RN  Outcome: Progressing  Note: Skin assessment as charted, assist patient with Q2 and PRN turning.     Problem: Safety - Adult  Goal: Free from fall injury  1/19/2024 0444 by Dario Ruiz, RN  Note: No falls noted this shift, bed in lowest position, call light within reach, side rails up X's 2.      
  Problem: Skin/Tissue Integrity  Goal: Absence of new skin breakdown  Description: 1.  Monitor for areas of redness and/or skin breakdown  2.  Assess vascular access sites hourly  3.  Every 4-6 hours minimum:  Change oxygen saturation probe site  4.  Every 4-6 hours:  If on nasal continuous positive airway pressure, respiratory therapy assess nares and determine need for appliance change or resting period.  1/20/2024 1703 by Emilie Mariscal, RN  Outcome: Progressing     Problem: Discharge Planning  Goal: Discharge to home or other facility with appropriate resources  Outcome: Progressing     Problem: Safety - Adult  Goal: Free from fall injury  1/20/2024 1703 by Emilie Mariscal, RN  Outcome: Progressing     Problem: ABCDS Injury Assessment  Goal: Absence of physical injury  Outcome: Progressing     Problem: Pain  Goal: Verbalizes/displays adequate comfort level or baseline comfort level  Outcome: Progressing     
  Problem: Skin/Tissue Integrity  Goal: Absence of new skin breakdown  Description: 1.  Monitor for areas of redness and/or skin breakdown  2.  Assess vascular access sites hourly  3.  Every 4-6 hours minimum:  Change oxygen saturation probe site  4.  Every 4-6 hours:  If on nasal continuous positive airway pressure, respiratory therapy assess nares and determine need for appliance change or resting period.  1/21/2024 2004 by Ninfa Reyes, RN  Outcome: Progressing     Problem: Discharge Planning  Goal: Discharge to home or other facility with appropriate resources  1/21/2024 2004 by Ninfa Reyes, RN  Outcome: Progressing  Flowsheets (Taken 1/21/2024 2002)  Discharge to home or other facility with appropriate resources:   Identify barriers to discharge with patient and caregiver   Arrange for needed discharge resources and transportation as appropriate   Identify discharge learning needs (meds, wound care, etc)     Problem: Safety - Adult  Goal: Free from fall injury  1/21/2024 2004 by Ninfa Reyes, RN  Outcome: Progressing     Problem: ABCDS Injury Assessment  Goal: Absence of physical injury  1/21/2024 2004 by Ninfa Reyes, RN  Outcome: Progressing     Problem: Pain  Goal: Verbalizes/displays adequate comfort level or baseline comfort level  1/21/2024 2004 by Ninfa Reyes, RN  Outcome: Progressing     
  Problem: Skin/Tissue Integrity  Goal: Absence of new skin breakdown  Description: 1.  Monitor for areas of redness and/or skin breakdown  2.  Assess vascular access sites hourly  3.  Every 4-6 hours minimum:  Change oxygen saturation probe site  4.  Every 4-6 hours:  If on nasal continuous positive airway pressure, respiratory therapy assess nares and determine need for appliance change or resting period.  1/23/2024 1505 by Vangie Massey, RN  Outcome: Progressing     Problem: Discharge Planning  Goal: Discharge to home or other facility with appropriate resources  1/23/2024 1505 by Vangie Massey, RN  Outcome: Progressing     Problem: Safety - Adult  Goal: Free from fall injury  1/23/2024 1505 by Vangie Massey, RN  Outcome: Progressing     Problem: ABCDS Injury Assessment  Goal: Absence of physical injury  1/23/2024 1505 by Vangie Massey, RN  Outcome: Progressing     
  Problem: Skin/Tissue Integrity  Goal: Absence of new skin breakdown  Description: 1.  Monitor for areas of redness and/or skin breakdown  2.  Assess vascular access sites hourly  3.  Every 4-6 hours minimum:  Change oxygen saturation probe site  4.  Every 4-6 hours:  If on nasal continuous positive airway pressure, respiratory therapy assess nares and determine need for appliance change or resting period.  1/23/2024 2058 by Ismael Colindres RN  Outcome: Progressing  1/23/2024 1505 by Vangie Massey RN  Outcome: Progressing     Problem: Discharge Planning  Goal: Discharge to home or other facility with appropriate resources  1/23/2024 2058 by Ismael Colindres RN  Outcome: Progressing  1/23/2024 1505 by Vangie Massey RN  Outcome: Progressing     Problem: Safety - Adult  Goal: Free from fall injury  1/23/2024 2058 by Ismael Colindres RN  Outcome: Progressing  1/23/2024 1505 by Vangie Massey RN  Outcome: Progressing     Problem: ABCDS Injury Assessment  Goal: Absence of physical injury  1/23/2024 2058 by Ismael Colindres RN  Outcome: Progressing  1/23/2024 1505 by Vangie Massey RN  Outcome: Progressing     Problem: Pain  Goal: Verbalizes/displays adequate comfort level or baseline comfort level  Outcome: Progressing     
  Problem: Skin/Tissue Integrity  Goal: Absence of new skin breakdown  Description: 1.  Monitor for areas of redness and/or skin breakdown  2.  Assess vascular access sites hourly  3.  Every 4-6 hours minimum:  Change oxygen saturation probe site  4.  Every 4-6 hours:  If on nasal continuous positive airway pressure, respiratory therapy assess nares and determine need for appliance change or resting period.  1/25/2024 0454 by Oppenheim, Evan, RN  Outcome: Progressing  1/24/2024 1518 by Carolina Kearney RN  Outcome: Progressing     Problem: Discharge Planning  Goal: Discharge to home or other facility with appropriate resources  1/25/2024 0454 by Oppenheim, Evan, RN  Outcome: Progressing  Flowsheets (Taken 1/24/2024 2115)  Discharge to home or other facility with appropriate resources: Identify barriers to discharge with patient and caregiver  1/24/2024 1518 by Carolina Kearney RN  Outcome: Progressing     Problem: Safety - Adult  Goal: Free from fall injury  1/25/2024 0454 by Oppenheim, Evan, RN  Outcome: Progressing  Flowsheets (Taken 1/25/2024 0042)  Free From Fall Injury: Instruct family/caregiver on patient safety  1/24/2024 1518 by Carolina Kearney RN  Outcome: Progressing     Problem: ABCDS Injury Assessment  Goal: Absence of physical injury  1/25/2024 0454 by Oppenheim, Evan, RN  Outcome: Progressing  1/24/2024 1518 by Carolina Kearney RN  Outcome: Progressing     Problem: Pain  Goal: Verbalizes/displays adequate comfort level or baseline comfort level  1/25/2024 0454 by Oppenheim, Evan, RN  Outcome: Progressing  1/24/2024 1518 by Carolina Kearney RN  Outcome: Progressing     Problem: Nutrition Deficit:  Goal: Optimize nutritional status  1/25/2024 0454 by Oppenheim, Evan, RN  Outcome: Progressing  1/24/2024 1518 by Carolina Kearney RN  Outcome: Progressing     
  Problem: Skin/Tissue Integrity  Goal: Absence of new skin breakdown  Description: 1.  Monitor for areas of redness and/or skin breakdown  2.  Assess vascular access sites hourly  3.  Every 4-6 hours minimum:  Change oxygen saturation probe site  4.  Every 4-6 hours:  If on nasal continuous positive airway pressure, respiratory therapy assess nares and determine need for appliance change or resting period.  1/25/2024 1533 by Carolina Kearney RN  Outcome: Progressing, Pt remain free of skin breakdown. Educated on the importance of turning and alternating position.     Problem: Safety - Adult  Goal: Free from fall injury  1/25/2024 1533 by Carolina Kearney RN  Outcome: Progressing, Patient remains free of incidence/ injury. Bed remains in low position. Side rails up x2.       Problem: Pain  Goal: Verbalizes/displays adequate comfort level or baseline comfort level  1/25/2024 1533 by Carolina Kearney, RN  Outcome: Progressing, Pt educated on non-pharmacological pain interventions. PRN pain medications administered.        
  Problem: Skin/Tissue Integrity  Goal: Absence of new skin breakdown  Description: 1.  Monitor for areas of redness and/or skin breakdown  2.  Assess vascular access sites hourly  3.  Every 4-6 hours minimum:  Change oxygen saturation probe site  4.  Every 4-6 hours:  If on nasal continuous positive airway pressure, respiratory therapy assess nares and determine need for appliance change or resting period.  1/26/2024 0424 by Oppenheim, Evan, RN  Outcome: Progressing  1/25/2024 1533 by Carolina Kearney RN  Outcome: Progressing     Problem: Discharge Planning  Goal: Discharge to home or other facility with appropriate resources  1/26/2024 0424 by Oppenheim, Evan, RN  Outcome: Progressing  Flowsheets (Taken 1/25/2024 2046)  Discharge to home or other facility with appropriate resources: Identify barriers to discharge with patient and caregiver  1/25/2024 1533 by Carolina Kearney RN  Outcome: Progressing     Problem: Safety - Adult  Goal: Free from fall injury  1/26/2024 0424 by Oppenheim, Evan, RN  Outcome: Progressing  Flowsheets (Taken 1/26/2024 0237)  Free From Fall Injury: Instruct family/caregiver on patient safety  1/25/2024 1533 by Carolina Kearney RN  Outcome: Progressing     Problem: ABCDS Injury Assessment  Goal: Absence of physical injury  1/26/2024 0424 by Oppenheim, Evan, RN  Outcome: Progressing  Flowsheets (Taken 1/26/2024 0237)  Absence of Physical Injury: Implement safety measures based on patient assessment  1/25/2024 1533 by Carolina Kearney RN  Outcome: Progressing     Problem: Pain  Goal: Verbalizes/displays adequate comfort level or baseline comfort level  1/26/2024 0424 by Oppenheim, Evan, RN  Outcome: Progressing  1/25/2024 1533 by Carolina Kearney RN  Outcome: Progressing     Problem: Nutrition Deficit:  Goal: Optimize nutritional status  1/26/2024 0424 by Oppenheim, Evan, RN  Outcome: Progressing  1/25/2024 1533 by Carolina Kearney RN  Outcome: Progressing     
  Problem: Skin/Tissue Integrity  Goal: Absence of new skin breakdown  Description: 1.  Monitor for areas of redness and/or skin breakdown  2.  Assess vascular access sites hourly  3.  Every 4-6 hours minimum:  Change oxygen saturation probe site  4.  Every 4-6 hours:  If on nasal continuous positive airway pressure, respiratory therapy assess nares and determine need for appliance change or resting period.  1/27/2024 0510 by Jyoti Cr RN  Outcome: Progressing  1/26/2024 1515 by Carolina Kearney RN  Outcome: Progressing  1/26/2024 1514 by Carolina Kearney RN  Outcome: Progressing     Problem: Discharge Planning  Goal: Discharge to home or other facility with appropriate resources  1/27/2024 0510 by Jyoti Cr RN  Outcome: Progressing  Flowsheets (Taken 1/26/2024 1959 by Landon Vaughan RN)  Discharge to home or other facility with appropriate resources: Identify barriers to discharge with patient and caregiver  1/26/2024 1515 by Carolina Kearney RN  Outcome: Progressing  1/26/2024 1514 by Carolina Kearney RN  Outcome: Progressing     Problem: Safety - Adult  Goal: Free from fall injury  1/27/2024 0510 by Jyoti Cr RN  Outcome: Progressing  Flowsheets (Taken 1/26/2024 2034 by Landon Vaughan, RN)  Free From Fall Injury: Instruct family/caregiver on patient safety  1/26/2024 1515 by Carolina Kearney RN  Outcome: Progressing  1/26/2024 1514 by Carolina Kearney RN  Outcome: Progressing     Problem: ABCDS Injury Assessment  Goal: Absence of physical injury  Recent Flowsheet Documentation  Taken 1/26/2024 2034 by Landon Vaughan RN  Absence of Physical Injury: Implement safety measures based on patient assessment  1/26/2024 1515 by Carolina Kearney RN  Outcome: Progressing  1/26/2024 1514 by Carolina Kearney RN  Outcome: Progressing     Problem: Pain  Goal: Verbalizes/displays adequate comfort level or baseline comfort level  1/26/2024 1515 by Carolina Kearney 
  Problem: Skin/Tissue Integrity  Goal: Absence of new skin breakdown  Description: 1.  Monitor for areas of redness and/or skin breakdown  2.  Assess vascular access sites hourly  3.  Every 4-6 hours minimum:  Change oxygen saturation probe site  4.  Every 4-6 hours:  If on nasal continuous positive airway pressure, respiratory therapy assess nares and determine need for appliance change or resting period.  1/27/2024 1812 by Kaylyn Greene RN  Outcome: Progressing  1/27/2024 0510 by Jyoti Cr RN  Outcome: Progressing     Problem: Discharge Planning  Goal: Discharge to home or other facility with appropriate resources  1/27/2024 1812 by Kaylyn Greene RN  Outcome: Progressing  1/27/2024 0510 by Jyoti Cr RN  Outcome: Progressing  Flowsheets (Taken 1/26/2024 1959 by Landon Vaughan, RN)  Discharge to home or other facility with appropriate resources: Identify barriers to discharge with patient and caregiver     Problem: Safety - Adult  Goal: Free from fall injury  1/27/2024 1812 by Kaylyn Greene RN  Outcome: Progressing  1/27/2024 0510 by Jyoti Cr RN  Outcome: Progressing  Flowsheets (Taken 1/26/2024 2034 by Landon Vaughan, RN)  Free From Fall Injury: Instruct family/caregiver on patient safety     Problem: ABCDS Injury Assessment  Goal: Absence of physical injury  Outcome: Progressing     Problem: Pain  Goal: Verbalizes/displays adequate comfort level or baseline comfort level  Outcome: Progressing     Problem: Nutrition Deficit:  Goal: Optimize nutritional status  Outcome: Progressing     
  Problem: Skin/Tissue Integrity  Goal: Absence of new skin breakdown  Description: 1.  Monitor for areas of redness and/or skin breakdown  2.  Assess vascular access sites hourly  3.  Every 4-6 hours minimum:  Change oxygen saturation probe site  4.  Every 4-6 hours:  If on nasal continuous positive airway pressure, respiratory therapy assess nares and determine need for appliance change or resting period.  1/28/2024 0507 by Josemanuel Richardson, RN  Outcome: Progressing     Problem: Safety - Adult  Goal: Free from fall injury  1/28/2024 0507 by Josemanuel Richardson, RN  Outcome: Progressing     
  Problem: Skin/Tissue Integrity  Goal: Absence of new skin breakdown  Description: 1.  Monitor for areas of redness and/or skin breakdown  2.  Assess vascular access sites hourly  3.  Every 4-6 hours minimum:  Change oxygen saturation probe site  4.  Every 4-6 hours:  If on nasal continuous positive airway pressure, respiratory therapy assess nares and determine need for appliance change or resting period.  1/28/2024 1815 by Zita Looney, RN  Outcome: Progressing     Problem: Discharge Planning  Goal: Discharge to home or other facility with appropriate resources  Outcome: Progressing     Problem: Safety - Adult  Goal: Free from fall injury  1/28/2024 1815 by Zita Looney, RN  Outcome: Progressing     Problem: ABCDS Injury Assessment  Goal: Absence of physical injury  Outcome: Progressing     Problem: Pain  Goal: Verbalizes/displays adequate comfort level or baseline comfort level  Outcome: Progressing     Problem: Nutrition Deficit:  Goal: Optimize nutritional status  Outcome: Progressing     
  Problem: Skin/Tissue Integrity  Goal: Absence of new skin breakdown  Description: 1.  Monitor for areas of redness and/or skin breakdown  2.  Assess vascular access sites hourly  3.  Every 4-6 hours minimum:  Change oxygen saturation probe site  4.  Every 4-6 hours:  If on nasal continuous positive airway pressure, respiratory therapy assess nares and determine need for appliance change or resting period.  1/30/2024 0445 by Josemanuel Richardson, RN  Outcome: Progressing     Problem: Safety - Adult  Goal: Free from fall injury  1/30/2024 0445 by Josemanuel Richardson, RN  Outcome: Progressing     
  Problem: Skin/Tissue Integrity  Goal: Absence of new skin breakdown  Description: 1.  Monitor for areas of redness and/or skin breakdown  2.  Assess vascular access sites hourly  3.  Every 4-6 hours minimum:  Change oxygen saturation probe site  4.  Every 4-6 hours:  If on nasal continuous positive airway pressure, respiratory therapy assess nares and determine need for appliance change or resting period.  1/30/2024 1618 by Emilie Banks RN  Outcome: Progressing  1/30/2024 0445 by Josemanuel Richardson RN  Outcome: Progressing     Problem: Discharge Planning  Goal: Discharge to home or other facility with appropriate resources  Outcome: Progressing  Flowsheets (Taken 1/30/2024 0853)  Discharge to home or other facility with appropriate resources:   Identify barriers to discharge with patient and caregiver   Arrange for needed discharge resources and transportation as appropriate   Identify discharge learning needs (meds, wound care, etc)   Refer to discharge planning if patient needs post-hospital services based on physician order or complex needs related to functional status, cognitive ability or social support system     Problem: Safety - Adult  Goal: Free from fall injury  1/30/2024 1618 by Emilie Banks, RN  Outcome: Progressing  1/30/2024 0445 by Josemanuel Richardson RN  Outcome: Progressing     Problem: ABCDS Injury Assessment  Goal: Absence of physical injury  Outcome: Progressing     Problem: Pain  Goal: Verbalizes/displays adequate comfort level or baseline comfort level  Outcome: Progressing     Problem: Nutrition Deficit:  Goal: Optimize nutritional status  Outcome: Progressing  Flowsheets (Taken 1/30/2024 1346 by Marilia King, RD, LD)  Nutrient intake appropriate for improving, restoring, or maintaining nutritional needs: Monitor oral intake, labs, and treatment plans     
  Problem: Skin/Tissue Integrity  Goal: Absence of new skin breakdown  Description: 1.  Monitor for areas of redness and/or skin breakdown  2.  Assess vascular access sites hourly  3.  Every 4-6 hours minimum:  Change oxygen saturation probe site  4.  Every 4-6 hours:  If on nasal continuous positive airway pressure, respiratory therapy assess nares and determine need for appliance change or resting period.  1/31/2024 0444 by Saloni Coffey, RN  Outcome: Progressing  Note: Educated pt. About the importance of frequent turns and adequate nutrition and pitting pillows on bony parts of the body      Problem: Discharge Planning  Goal: Discharge to home or other facility with appropriate resources  1/31/2024 0444 by Saloni Coffey, RN  Outcome: Progressing  Flowsheets (Taken 1/31/2024 0444)  Discharge to home or other facility with appropriate resources:   Identify discharge learning needs (meds, wound care, etc)   Refer to discharge planning if patient needs post-hospital services based on physician order or complex needs related to functional status, cognitive ability or social support system   Identify barriers to discharge with patient and caregiver   Arrange for needed discharge resources and transportation as appropriate  Note: Inform pt. Of discharge teaching and planned. Instructed pt. To inform me if further teaching need to be done.      Problem: Safety - Adult  Goal: Free from fall injury  1/31/2024 0444 by Saloni Coffey, RN  Outcome: Progressing  Flowsheets (Taken 1/31/2024 0444)  Free From Fall Injury:   Instruct family/caregiver on patient safety   Based on caregiver fall risk screen, instruct family/caregiver to ask for assistance with transferring infant if caregiver noted to have fall risk factors  Note: Made sure call light was in reach, a clear pathway and adequate lighting was provided. Also made sure that the pt. Was wearing non-slip socks.      
  Problem: Skin/Tissue Integrity  Goal: Absence of new skin breakdown  Description: 1.  Monitor for areas of redness and/or skin breakdown  2.  Assess vascular access sites hourly  3.  Every 4-6 hours minimum:  Change oxygen saturation probe site  4.  Every 4-6 hours:  If on nasal continuous positive airway pressure, respiratory therapy assess nares and determine need for appliance change or resting period.  2/2/2024 0609 by Schober, Robyn L, RN  Outcome: Progressing     Problem: Discharge Planning  Goal: Discharge to home or other facility with appropriate resources  2/2/2024 0609 by Schober, Robyn L, RN  Outcome: Progressing  Flowsheets (Taken 2/1/2024 2024)  Discharge to home or other facility with appropriate resources: Identify barriers to discharge with patient and caregiver     Problem: Safety - Adult  Goal: Free from fall injury  2/2/2024 0609 by Schober, Robyn L, RN  Outcome: Progressing  Flowsheets (Taken 2/1/2024 2353)  Free From Fall Injury: Based on caregiver fall risk screen, instruct family/caregiver to ask for assistance with transferring infant if caregiver noted to have fall risk factors     Problem: ABCDS Injury Assessment  Goal: Absence of physical injury  2/2/2024 0609 by Schober, Robyn L, RN  Outcome: Progressing  Flowsheets (Taken 2/1/2024 2353)  Absence of Physical Injury: Implement safety measures based on patient assessment     Problem: Pain  Goal: Verbalizes/displays adequate comfort level or baseline comfort level  2/2/2024 0609 by Schober, Robyn L, RN  Outcome: Progressing     Problem: Nutrition Deficit:  Goal: Optimize nutritional status  2/2/2024 0609 by Schober, Robyn L, RN  Outcome: Progressing     
  Problem: Skin/Tissue Integrity  Goal: Absence of new skin breakdown  Description: 1.  Monitor for areas of redness and/or skin breakdown  2.  Assess vascular access sites hourly  3.  Every 4-6 hours minimum:  Change oxygen saturation probe site  4.  Every 4-6 hours:  If on nasal continuous positive airway pressure, respiratory therapy assess nares and determine need for appliance change or resting period.  2/2/2024 1421 by Elaine Melgar RN  Outcome: Progressing  2/2/2024 0609 by Schober, Robyn L, RN  Outcome: Progressing     Problem: Discharge Planning  Goal: Discharge to home or other facility with appropriate resources  2/2/2024 1421 by Elaine Melgar RN  Outcome: Progressing  Flowsheets (Taken 2/2/2024 0900)  Discharge to home or other facility with appropriate resources:   Identify barriers to discharge with patient and caregiver   Arrange for needed discharge resources and transportation as appropriate   Arrange for interpreters to assist at discharge as needed   Identify discharge learning needs (meds, wound care, etc)   Refer to discharge planning if patient needs post-hospital services based on physician order or complex needs related to functional status, cognitive ability or social support system  2/2/2024 0609 by Schober, Robyn L, RN  Outcome: Progressing  Flowsheets (Taken 2/1/2024 2024)  Discharge to home or other facility with appropriate resources: Identify barriers to discharge with patient and caregiver     Problem: Safety - Adult  Goal: Free from fall injury  2/2/2024 1421 by Elaine Melgar RN  Outcome: Progressing  Flowsheets (Taken 2/2/2024 0900)  Free From Fall Injury:   Instruct family/caregiver on patient safety   Based on caregiver fall risk screen, instruct family/caregiver to ask for assistance with transferring infant if caregiver noted to have fall risk factors  2/2/2024 0609 by Schober, Robyn L, RN  Outcome: Progressing  Flowsheets (Taken 2/1/2024 2353)  Free From Fall Injury: 
  Problem: Skin/Tissue Integrity  Goal: Absence of new skin breakdown  Description: 1.  Monitor for areas of redness and/or skin breakdown  2.  Assess vascular access sites hourly  3.  Every 4-6 hours minimum:  Change oxygen saturation probe site  4.  Every 4-6 hours:  If on nasal continuous positive airway pressure, respiratory therapy assess nares and determine need for appliance change or resting period.  2/2/2024 2208 by Selena Patricio RN  Outcome: Progressing  2/2/2024 1421 by Elaine Melgar RN  Outcome: Progressing     Problem: Discharge Planning  Goal: Discharge to home or other facility with appropriate resources  2/2/2024 2208 by Selena Patricio RN  Outcome: Progressing  Flowsheets (Taken 2/2/2024 0900 by Elaine Melgar RN)  Discharge to home or other facility with appropriate resources:   Identify barriers to discharge with patient and caregiver   Arrange for needed discharge resources and transportation as appropriate   Arrange for interpreters to assist at discharge as needed   Identify discharge learning needs (meds, wound care, etc)   Refer to discharge planning if patient needs post-hospital services based on physician order or complex needs related to functional status, cognitive ability or social support system  2/2/2024 1421 by Elaine Melgar RN  Outcome: Progressing  Flowsheets (Taken 2/2/2024 0900)  Discharge to home or other facility with appropriate resources:   Identify barriers to discharge with patient and caregiver   Arrange for needed discharge resources and transportation as appropriate   Arrange for interpreters to assist at discharge as needed   Identify discharge learning needs (meds, wound care, etc)   Refer to discharge planning if patient needs post-hospital services based on physician order or complex needs related to functional status, cognitive ability or social support system     Problem: Safety - Adult  Goal: Free from fall injury  2/2/2024 2208 by Selena Patricio, 
  Problem: Skin/Tissue Integrity  Goal: Absence of new skin breakdown  Description: 1.  Monitor for areas of redness and/or skin breakdown  2.  Assess vascular access sites hourly  3.  Every 4-6 hours minimum:  Change oxygen saturation probe site  4.  Every 4-6 hours:  If on nasal continuous positive airway pressure, respiratory therapy assess nares and determine need for appliance change or resting period.  2/4/2024 0024 by Casandra Edward RN  Outcome: Progressing  2/3/2024 1846 by Aylin Frias RN  Outcome: Progressing     Problem: Discharge Planning  Goal: Discharge to home or other facility with appropriate resources  2/4/2024 0024 by Casandra Edward RN  Outcome: Progressing  2/3/2024 1846 by Aylin Frias RN  Outcome: Progressing     Problem: Safety - Adult  Goal: Free from fall injury  2/4/2024 0024 by Casandra Edward RN  Outcome: Progressing  2/3/2024 1846 by Aylin Frias RN  Outcome: Progressing     Problem: ABCDS Injury Assessment  Goal: Absence of physical injury  2/4/2024 0024 by Casandra Edward RN  Outcome: Progressing  2/3/2024 1846 by Aylin Frias RN  Outcome: Progressing     Problem: Pain  Goal: Verbalizes/displays adequate comfort level or baseline comfort level  2/4/2024 0024 by Casandra Edward RN  Outcome: Progressing  2/3/2024 1846 by Aylin Frias RN  Outcome: Progressing     Problem: Nutrition Deficit:  Goal: Optimize nutritional status  Outcome: Progressing     
  Problem: Skin/Tissue Integrity  Goal: Absence of new skin breakdown  Description: 1.  Monitor for areas of redness and/or skin breakdown  2.  Assess vascular access sites hourly  3.  Every 4-6 hours minimum:  Change oxygen saturation probe site  4.  Every 4-6 hours:  If on nasal continuous positive airway pressure, respiratory therapy assess nares and determine need for appliance change or resting period.  2/5/2024 0407 by Edwina Smith RN  Outcome: Progressing     Problem: Discharge Planning  Goal: Discharge to home or other facility with appropriate resources  2/5/2024 0407 by Edwina Smith RN  Outcome: Progressing     Problem: Safety - Adult  Goal: Free from fall injury  2/5/2024 0407 by Edwina Smith RN  Outcome: Progressing     Problem: ABCDS Injury Assessment  Goal: Absence of physical injury  2/5/2024 0407 by Edwina Smith RN  Outcome: Progressing     Problem: Pain  Goal: Verbalizes/displays adequate comfort level or baseline comfort level  2/5/2024 0407 by Edwina Smith RN  Outcome: Progressing     Problem: Nutrition Deficit:  Goal: Optimize nutritional status  Outcome: Progressing     
  Problem: Skin/Tissue Integrity  Goal: Absence of new skin breakdown  Description: 1.  Monitor for areas of redness and/or skin breakdown  2.  Assess vascular access sites hourly  3.  Every 4-6 hours minimum:  Change oxygen saturation probe site  4.  Every 4-6 hours:  If on nasal continuous positive airway pressure, respiratory therapy assess nares and determine need for appliance change or resting period.  2/5/2024 1707 by Aylin Frias RN  Outcome: Progressing  2/5/2024 0407 by Edwina Smith RN  Outcome: Progressing     Problem: Discharge Planning  Goal: Discharge to home or other facility with appropriate resources  2/5/2024 1707 by Aylin Frias RN  Outcome: Progressing  2/5/2024 0407 by Edwina Smith RN  Outcome: Progressing     Problem: Safety - Adult  Goal: Free from fall injury  2/5/2024 1707 by Aylin Frias RN  Outcome: Progressing  2/5/2024 0407 by Edwina Smith RN  Outcome: Progressing     Problem: ABCDS Injury Assessment  Goal: Absence of physical injury  2/5/2024 1707 by Aylin Frias RN  Outcome: Progressing  2/5/2024 0407 by Edwina Smith RN  Outcome: Progressing     Problem: Pain  Goal: Verbalizes/displays adequate comfort level or baseline comfort level  2/5/2024 1707 by Aylin Frias RN  Outcome: Progressing  2/5/2024 0407 by Edwina Smith RN  Outcome: Progressing     Problem: Nutrition Deficit:  Goal: Optimize nutritional status  Recent Flowsheet Documentation  Taken 2/5/2024 1332 by Marilia King RD, LD  Nutrient intake appropriate for improving, restoring, or maintaining nutritional needs: Monitor oral intake, labs, and treatment plans  2/5/2024 0407 by Edwina Smith RN  Outcome: Progressing     
  Problem: Skin/Tissue Integrity  Goal: Absence of new skin breakdown  Description: 1.  Monitor for areas of redness and/or skin breakdown  2.  Assess vascular access sites hourly  3.  Every 4-6 hours minimum:  Change oxygen saturation probe site  4.  Every 4-6 hours:  If on nasal continuous positive airway pressure, respiratory therapy assess nares and determine need for appliance change or resting period.  2/6/2024 0458 by Josemanuel Richardson, RN  Outcome: Progressing     Problem: Safety - Adult  Goal: Free from fall injury  2/6/2024 0458 by Josemanuel Richardson, RN  Outcome: Progressing     
  Problem: Skin/Tissue Integrity  Goal: Absence of new skin breakdown  Description: 1.  Monitor for areas of redness and/or skin breakdown  2.  Assess vascular access sites hourly  3.  Every 4-6 hours minimum:  Change oxygen saturation probe site  4.  Every 4-6 hours:  If on nasal continuous positive airway pressure, respiratory therapy assess nares and determine need for appliance change or resting period.  2/6/2024 1403 by Elaine Melgar RN  Outcome: Progressing  2/6/2024 0458 by Josemanuel Richardson RN  Outcome: Progressing     Problem: Discharge Planning  Goal: Discharge to home or other facility with appropriate resources  Outcome: Progressing  Flowsheets (Taken 2/6/2024 0830)  Discharge to home or other facility with appropriate resources:   Identify barriers to discharge with patient and caregiver   Identify discharge learning needs (meds, wound care, etc)   Arrange for needed discharge resources and transportation as appropriate   Arrange for interpreters to assist at discharge as needed   Refer to discharge planning if patient needs post-hospital services based on physician order or complex needs related to functional status, cognitive ability or social support system     Problem: Safety - Adult  Goal: Free from fall injury  2/6/2024 1403 by Elaine Melgar RN  Outcome: Progressing  Flowsheets (Taken 2/6/2024 0830)  Free From Fall Injury:   Instruct family/caregiver on patient safety   Based on caregiver fall risk screen, instruct family/caregiver to ask for assistance with transferring infant if caregiver noted to have fall risk factors  2/6/2024 0458 by Josemanuel Richardson, RN  Outcome: Progressing     Problem: ABCDS Injury Assessment  Goal: Absence of physical injury  Outcome: Progressing  Flowsheets (Taken 2/6/2024 0830)  Absence of Physical Injury: Implement safety measures based on patient assessment     Problem: Pain  Goal: Verbalizes/displays adequate comfort level or baseline comfort level  Outcome: 
  Problem: Skin/Tissue Integrity  Goal: Absence of new skin breakdown  Description: 1.  Monitor for areas of redness and/or skin breakdown  2.  Assess vascular access sites hourly  3.  Every 4-6 hours minimum:  Change oxygen saturation probe site  4.  Every 4-6 hours:  If on nasal continuous positive airway pressure, respiratory therapy assess nares and determine need for appliance change or resting period.  Outcome: Progressing     Problem: Discharge Planning  Goal: Discharge to home or other facility with appropriate resources  Outcome: Progressing     Problem: Safety - Adult  Goal: Free from fall injury  Outcome: Progressing     Problem: ABCDS Injury Assessment  Goal: Absence of physical injury  Outcome: Progressing     
  Problem: Skin/Tissue Integrity  Goal: Absence of new skin breakdown  Description: 1.  Monitor for areas of redness and/or skin breakdown  2.  Assess vascular access sites hourly  3.  Every 4-6 hours minimum:  Change oxygen saturation probe site  4.  Every 4-6 hours:  If on nasal continuous positive airway pressure, respiratory therapy assess nares and determine need for appliance change or resting period.  Outcome: Progressing     Problem: Discharge Planning  Goal: Discharge to home or other facility with appropriate resources  Outcome: Progressing     Problem: Safety - Adult  Goal: Free from fall injury  Outcome: Progressing     Problem: ABCDS Injury Assessment  Goal: Absence of physical injury  Outcome: Progressing     
  Problem: Skin/Tissue Integrity  Goal: Absence of new skin breakdown  Description: 1.  Monitor for areas of redness and/or skin breakdown  2.  Assess vascular access sites hourly  3.  Every 4-6 hours minimum:  Change oxygen saturation probe site  4.  Every 4-6 hours:  If on nasal continuous positive airway pressure, respiratory therapy assess nares and determine need for appliance change or resting period.  Outcome: Progressing     Problem: Discharge Planning  Goal: Discharge to home or other facility with appropriate resources  Outcome: Progressing     Problem: Safety - Adult  Goal: Free from fall injury  Outcome: Progressing     Problem: ABCDS Injury Assessment  Goal: Absence of physical injury  Outcome: Progressing     Problem: Pain  Goal: Verbalizes/displays adequate comfort level or baseline comfort level  Outcome: Progressing     
  Problem: Skin/Tissue Integrity  Goal: Absence of new skin breakdown  Description: 1.  Monitor for areas of redness and/or skin breakdown  2.  Assess vascular access sites hourly  3.  Every 4-6 hours minimum:  Change oxygen saturation probe site  4.  Every 4-6 hours:  If on nasal continuous positive airway pressure, respiratory therapy assess nares and determine need for appliance change or resting period.  Outcome: Progressing     Problem: Discharge Planning  Goal: Discharge to home or other facility with appropriate resources  Outcome: Progressing  Flowsheets (Taken 1/31/2024 2025)  Discharge to home or other facility with appropriate resources: Identify barriers to discharge with patient and caregiver     Problem: Safety - Adult  Goal: Free from fall injury  Outcome: Progressing  Flowsheets (Taken 2/1/2024 0407)  Free From Fall Injury: Based on caregiver fall risk screen, instruct family/caregiver to ask for assistance with transferring infant if caregiver noted to have fall risk factors     Problem: ABCDS Injury Assessment  Goal: Absence of physical injury  Outcome: Progressing  Flowsheets (Taken 2/1/2024 0407)  Absence of Physical Injury: Implement safety measures based on patient assessment     Problem: Pain  Goal: Verbalizes/displays adequate comfort level or baseline comfort level  Outcome: Progressing     Problem: Nutrition Deficit:  Goal: Optimize nutritional status  Outcome: Progressing     
  Problem: Skin/Tissue Integrity  Goal: Absence of new skin breakdown  Description: 1.  Monitor for areas of redness and/or skin breakdown  2.  Assess vascular access sites hourly  3.  Every 4-6 hours minimum:  Change oxygen saturation probe site  4.  Every 4-6 hours:  If on nasal continuous positive airway pressure, respiratory therapy assess nares and determine need for appliance change or resting period.  Outcome: Progressing  Note: Assess the overall condition of the skin. Check on bony prominences such as the sacrum, trochanters, scapulae, elbows, heels, inner and outer malleolus, inner and outer knees, back of head). Reinforce the importance of turning, mobility, and ambulation.      Problem: Safety - Adult  Goal: Free from fall injury  1/29/2024 1712 by Roberto Carlos Tierney RN  Outcome: Progressing  Note: Patient remains free of falls and injuries throughout shift. Bed remains in the lowest position, wheels locked, call light and bedside table are within reach.   1/29/2024 0452 by Josemanuel Richadrson, RN  Outcome: Progressing     Problem: ABCDS Injury Assessment  Goal: Absence of physical injury  1/29/2024 0452 by Josemanuel Richardson, RN  Outcome: Progressing     
  Problem: Skin/Tissue Integrity  Goal: Absence of new skin breakdown  Description: 1.  Monitor for areas of redness and/or skin breakdown  2.  Assess vascular access sites hourly  3.  Every 4-6 hours minimum:  Change oxygen saturation probe site  4.  Every 4-6 hours:  If on nasal continuous positive airway pressure, respiratory therapy assess nares and determine need for appliance change or resting period.  Outcome: Progressing  Note: Patient is able to turn in bed to prevent skin breakdown     Problem: Discharge Planning  Goal: Discharge to home or other facility with appropriate resources  1/23/2024 0248 by Ana Maria Lee RN  Outcome: Progressing  Note: Patient will begin discharge planning when the patient meets discharge criteria to go home or to a facility  1/22/2024 1727 by Leighann Braden, RN  Outcome: Progressing  Flowsheets (Taken 1/21/2024 2002 by Ninfa Reyes, RN)  Discharge to home or other facility with appropriate resources:   Identify barriers to discharge with patient and caregiver   Arrange for needed discharge resources and transportation as appropriate   Identify discharge learning needs (meds, wound care, etc)  Note: Patient refusing SNF. Patient wants to go home. Living conditions are not appropriate for living.      Problem: Safety - Adult  Goal: Free from fall injury  1/23/2024 0248 by Ana Maria Lee RN  Outcome: Progressing  Note: Call light within reach, bed alarm on, socks on and bed in lowest position   1/22/2024 1727 by Leighann Braden, RN  Outcome: Progressing  Note: Pt assessed as a fall risk this shift. Remains free from falls and accidental injury at this time. Fall precautions in place, including falling star sign and fall risk band on pt. Floor free from obstacles, and bed is locked and in lowest position. Adequate lighting provided.  Pt encouraged to call before getting OOB for any need.  Bed alarm activated. Chair alarm added. Patient educated on not to disconnect alarm as this is for 
  Problem: Skin/Tissue Integrity  Goal: Absence of new skin breakdown  Description: 1.  Monitor for areas of redness and/or skin breakdown  2.  Assess vascular access sites hourly  3.  Every 4-6 hours minimum:  Change oxygen saturation probe site  4.  Every 4-6 hours:  If on nasal continuous positive airway pressure, respiratory therapy assess nares and determine need for appliance change or resting period.  Outcome: Progressing  Note: Skin assessment as charted, assist patient with Q2 and PRN turning.     Problem: Safety - Adult  Goal: Free from fall injury  1/20/2024 0427 by Dario Ruiz, RN  Outcome: Progressing  Note: No falls noted this shift, bed in lowest position, call light within reach, side rails up X's 2.       
  Problem: Skin/Tissue Integrity  Goal: Absence of new skin breakdown  Description: 1.  Monitor for areas of redness and/or skin breakdown  2.  Assess vascular access sites hourly  3.  Every 4-6 hours minimum:  Change oxygen saturation probe site  4.  Every 4-6 hours:  If on nasal continuous positive airway pressure, respiratory therapy assess nares and determine need for appliance change or resting period.  Outcome: Progressing, Pt remain free of skin breakdown. Educated on the importance of turning and alternating position.     Problem: Safety - Adult  Goal: Free from fall injury  Outcome: Progressing, Patient remains free of incidence/ injury. Bed remains in low position. Side rails up x2.       Problem: Pain  Goal: Verbalizes/displays adequate comfort level or baseline comfort level  Outcome: Progressing, Pt educated on non-pharmacological pain interventions. PRN pain medications administered.        
RN  Outcome: Progressing  Flowsheets (Taken 2/1/2024 0407)  Absence of Physical Injury: Implement safety measures based on patient assessment     Problem: Pain  Goal: Verbalizes/displays adequate comfort level or baseline comfort level  2/1/2024 1636 by Emilie Banks RN  Outcome: Progressing  2/1/2024 0442 by Schober, Robyn L, RN  Outcome: Progressing     Problem: Nutrition Deficit:  Goal: Optimize nutritional status  2/1/2024 1636 by Emilie Banks RN  Outcome: Progressing  2/1/2024 0442 by Schober, Robyn L, RN  Outcome: Progressing

## 2024-02-07 VITALS
DIASTOLIC BLOOD PRESSURE: 42 MMHG | SYSTOLIC BLOOD PRESSURE: 143 MMHG | HEIGHT: 60 IN | BODY MASS INDEX: 25.93 KG/M2 | RESPIRATION RATE: 16 BRPM | HEART RATE: 52 BPM | WEIGHT: 132.08 LBS | OXYGEN SATURATION: 94 % | TEMPERATURE: 97.9 F

## 2024-02-07 PROCEDURE — 6360000002 HC RX W HCPCS: Performed by: INTERNAL MEDICINE

## 2024-02-07 PROCEDURE — 6370000000 HC RX 637 (ALT 250 FOR IP): Performed by: INTERNAL MEDICINE

## 2024-02-07 PROCEDURE — 99239 HOSP IP/OBS DSCHRG MGMT >30: CPT | Performed by: INTERNAL MEDICINE

## 2024-02-07 PROCEDURE — 6370000000 HC RX 637 (ALT 250 FOR IP): Performed by: PSYCHIATRY & NEUROLOGY

## 2024-02-07 RX ADMIN — ENOXAPARIN SODIUM 40 MG: 100 INJECTION SUBCUTANEOUS at 08:27

## 2024-02-07 RX ADMIN — Medication 100 MCG: at 08:27

## 2024-02-07 RX ADMIN — METOPROLOL TARTRATE 12.5 MG: 25 TABLET, FILM COATED ORAL at 08:27

## 2024-02-07 NOTE — PROGRESS NOTES
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 sitting up in bed upon entry and turns to engage interviewer. Noted foul smell and poor hygiene. Asks when she will be able to go home. States she feels well and is just thinking about her home and her 12 cats. Reports eating, sleeping, and energy levels are good. MOCA performed scoring a 19/30 (mild cognitive impairment). Case management working 
BEHAVIORAL HEALTH FOLLOW-UP NOTE     1/31/2024     Patient was seen and examined in person, Chart reviewed   Patient's case discussed with staff/team    Chief Complaint: \"I want to go home\"    Interim History:     The patient's mental status stable.  She has not been agitated and is pleasant on approach.  She denies any suicidal thoughts.  He has no auditory or visual hallucinations or other psychotic phenomena.  Her memory remains impaired though she is able to answer orientation questions.  She has no side effect from her medications          History of present illness:     The patient is a 89 y.o. female with no significant past psychiatric history who presented to the hospital with weakness and inability to care for herself and is admitted to the hospital for hypertensive urgency. Also found to have bradycardia and SVT. Psychiatry consulted to assess decision making capacity.      Patient seen and evaluated at bedside 1/25. Noted elevated BP, bradycardia, folate >20 (WNL) and vitamin B-12 204 (low). Patient sitting on side of bed eating lunch upon entry. Noted poor hygiene and foul smell. Patient is hard of hearing with increased latency with responses. Fixated on going home to take care of my cats which are in a locked house. \"They are my life\". Notes anxiety and worry about not being able to go home and take care of her 12 cats. Eating ans sleeping well. Oriented to person, place, and time.      Patient seen and evaluated at bedside 1/24. Noted elevated BP, bradycardia over night, Qtc 490, UA negative. Patient sitting up in bed upon entry and turns to engage interviewer. Noted foul smell and poor hygiene. Asks when she will be able to go home. States she feels well and is just thinking about her home and her 12 cats. Reports eating, sleeping, and energy levels are good. MOCA performed scoring a 19/30 (mild cognitive impairment). Case management working on placement and guardianship. Oriented to person, place, 
BEHAVIORAL HEALTH FOLLOW-UP NOTE     2/2/2024     Patient was seen and examined in person, Chart reviewed   Patient's case discussed with staff/team    Chief Complaint: \"I want to go home\"    Interim History:     Mildly tachycardic, /51. She is pleasant today, states that she slept well and is feeling better today. Still displays poor insight into her condition. Alert and oriented x2. Does not know who the president is. Continues to have memory difficulties.        History of present illness:     The patient is a 89 y.o. female with no significant past psychiatric history who presented to the hospital with weakness and inability to care for herself and is admitted to the hospital for hypertensive urgency. Also found to have bradycardia and SVT. Psychiatry consulted to assess decision making capacity.      Patient seen and evaluated at bedside 1/25. Noted elevated BP, bradycardia, folate >20 (WNL) and vitamin B-12 204 (low). Patient sitting on side of bed eating lunch upon entry. Noted poor hygiene and foul smell. Patient is hard of hearing with increased latency with responses. Fixated on going home to take care of my cats which are in a locked house. \"They are my life\". Notes anxiety and worry about not being able to go home and take care of her 12 cats. Eating and sleeping well. Oriented to person, place, and time.      Patient seen and evaluated at bedside 1/24. Noted elevated BP, bradycardia over night, Qtc 490, UA negative. Patient sitting up in bed upon entry and turns to engage interviewer. Noted foul smell and poor hygiene. Asks when she will be able to go home. States she feels well and is just thinking about her home and her 12 cats. Reports eating, sleeping, and energy levels are good. MOCA performed scoring a 19/30 (mild cognitive impairment). Case management working on placement and guardianship. Oriented to person, place, and time.      Patient seen and evaluated at bedside 1/23. Noted 
BEHAVIORAL HEALTH FOLLOW-UP NOTE     2/2/2024     Patient was seen and examined in person, Chart reviewed   Patient's case discussed with staff/team    Chief Complaint: \"I want to go home\"    Interim History:     The patient is pleasant on approach.  She continues to have poor insight into the living situation she was in and her inability to care for herself.  The patient has significant memory difficulties and is unable to recall how long she has been in the hospital, but she has been watching on television and what we have discussed during our several previous meetings.  The patient is not able to safely live at home on her own at this time          History of present illness:     The patient is a 89 y.o. female with no significant past psychiatric history who presented to the hospital with weakness and inability to care for herself and is admitted to the hospital for hypertensive urgency. Also found to have bradycardia and SVT. Psychiatry consulted to assess decision making capacity.      Patient seen and evaluated at bedside 1/25. Noted elevated BP, bradycardia, folate >20 (WNL) and vitamin B-12 204 (low). Patient sitting on side of bed eating lunch upon entry. Noted poor hygiene and foul smell. Patient is hard of hearing with increased latency with responses. Fixated on going home to take care of my cats which are in a locked house. \"They are my life\". Notes anxiety and worry about not being able to go home and take care of her 12 cats. Eating and sleeping well. Oriented to person, place, and time.      Patient seen and evaluated at bedside 1/24. Noted elevated BP, bradycardia over night, Qtc 490, UA negative. Patient sitting up in bed upon entry and turns to engage interviewer. Noted foul smell and poor hygiene. Asks when she will be able to go home. States she feels well and is just thinking about her home and her 12 cats. Reports eating, sleeping, and energy levels are good. MOCA performed scoring a 19/30 
BEHAVIORAL HEALTH FOLLOW-UP NOTE     2/5/2024     Patient was seen and examined in person, Chart reviewed   Patient's case discussed with staff/team    Chief Complaint: \"I'm paying my bills\"    Interim History:   No acute overnight events. Vitals wnl.   Patient seen and examined at bedside. The patient is pleasant on approach. She is in the midst of paying her utility bills. She would like to go home and care for her stray cats. She continues to have poor insight into the living situation she was in and her inability to care for herself. The patient has significant memory difficulties and is unable to recall how long she has been in the hospital. The patient is not able to safely live at home on her own at this time    History of present illness:     The patient is a 89 y.o. female with no significant past psychiatric history who presented to the hospital with weakness and inability to care for herself and is admitted to the hospital for hypertensive urgency. Also found to have bradycardia and SVT. Psychiatry consulted to assess decision making capacity.      Patient seen and evaluated at bedside 1/25. Noted elevated BP, bradycardia, folate >20 (WNL) and vitamin B-12 204 (low). Patient sitting on side of bed eating lunch upon entry. Noted poor hygiene and foul smell. Patient is hard of hearing with increased latency with responses. Fixated on going home to take care of my cats which are in a locked house. \"They are my life\". Notes anxiety and worry about not being able to go home and take care of her 12 cats. Eating and sleeping well. Oriented to person, place, and time.      Patient seen and evaluated at bedside 1/24. Noted elevated BP, bradycardia over night, Qtc 490, UA negative. Patient sitting up in bed upon entry and turns to engage interviewer. Noted foul smell and poor hygiene. Asks when she will be able to go home. States she feels well and is just thinking about her home and her 12 cats. Reports eating, 
Bed alarm going off, caught pt at edge of bed trying to turn off her bed alarm, pt stated she was trying to get something off the floor.  Let patient know she can use call light and we will get anything that falls on the floor up for her.   
Comprehensive Nutrition Assessment    Type and Reason for Visit:  RD Nutrition Re-Screen/LOS    Nutrition Recommendations/Plan:   Will continue Regular diet and add Ensure Enlive all trays     Malnutrition Assessment:  Malnutrition Status:  Moderate malnutrition (01/24/24 1434)    Context:  Social/Environmental Circumstances     Findings of the 6 clinical characteristics of malnutrition:  Energy Intake:  75% or less estimated energy requirements for 1 month or longer  Weight Loss:  Unable to assess (no wt history available)     Body Fat Loss:   (Moderate) Buccal region   Muscle Mass Loss:   (moderate) Calf (gastrocnemius), Hand (interosseous)  Fluid Accumulation:  No significant fluid accumulation     Strength:  Not Performed    Nutrition Assessment:    Pt was admitted due to HTN emergency. Observed pt with lunch tray consumed 100%. Nursing documents variable intake 25-75%.  Plan is for placement.    Nutrition Related Findings:    no edema, Labs/Meds: Reviewed, BM 1/22 Wound Type: None       Current Nutrition Intake & Therapies:    Average Meal Intake: %     ADULT DIET; Regular    Anthropometric Measures:  Height: 152.4 cm (5')  Ideal Body Weight (IBW): 100 lbs (45 kg)    Admission Body Weight: 51.7 kg (114 lb)  Current Body Weight: 59.9 kg (132 lb), 132 % IBW. Weight Source: Bed Scale  Current BMI (kg/m2): 25.8                          BMI Categories: Overweight (BMI 25.0-29.9)    Estimated Daily Nutrient Needs:  Energy Requirements Based On: Formula  Weight Used for Energy Requirements: Current  Energy (kcal/day): [place] x 1.2= 1100 kcal (6489-0224 kcal for wt gain)  Weight Used for Protein Requirements: Current  Protein (g/day): 1.5g/kg= 90 g  Method Used for Fluid Requirements: 1 ml/kcal    Nutrition Diagnosis:   Moderate malnutrition related to psychological cause or life stress as evidenced by Criteria as identified in malnutrition assessment    Nutrition Interventions:   Food and/or Nutrient 
Comprehensive Nutrition Assessment    Type and Reason for Visit:  Reassess    Nutrition Recommendations/Plan:   Will continue Regular diet and change supplements to Ensure High Protein all trays     Malnutrition Assessment:  Malnutrition Status:  Moderate malnutrition (01/24/24 1434)    Context:  Social/Environmental Circumstances     Findings of the 6 clinical characteristics of malnutrition:  Energy Intake:  50% or less estimated energy requirements for 1 month or longer  Weight Loss:  Unable to assess (no wt history available)     Body Fat Loss:   (moderate) Buccal region   Muscle Mass Loss:   (moderate) Hand (interosseous), Calf (gastrocnemius)  Fluid Accumulation:  No significant fluid accumulation     Strength:  Not Performed    Nutrition Assessment:    Pt consuming all food provided. She states the Ensure gives her diarrhea so she is drinking it slowly over the day.    Nutrition Related Findings:    no edema, Labs/Meds: Reviewed, BM 1/28 Wound Type: None       Current Nutrition Intake & Therapies:    Average Meal Intake: %  Average Supplements Intake: 26-50%, 51-75%  ADULT DIET; Regular  ADULT ORAL NUTRITION SUPPLEMENT; Breakfast, Lunch, Dinner; Low Calorie/High Protein Oral Supplement    Anthropometric Measures:  Height: 152.4 cm (5')  Ideal Body Weight (IBW): 100 lbs (45 kg)    Admission Body Weight: 51.7 kg (114 lb)  Current Body Weight: 59.9 kg (132 lb), 132 % IBW. Weight Source: Bed Scale  Current BMI (kg/m2): 25.8                          BMI Categories: Overweight (BMI 25.0-29.9)    Estimated Daily Nutrient Needs:  Energy Requirements Based On: Formula  Weight Used for Energy Requirements: Current  Energy (kcal/day): [place] x 1.2= 1100 kcal (5270-3097 kcal for wt gain)  Weight Used for Protein Requirements: Current  Protein (g/day): 1.5g/kg= 90 g  Method Used for Fluid Requirements: 1 ml/kcal    Nutrition Diagnosis:   Moderate malnutrition related to psychological cause or life stress as 
Comprehensive Nutrition Assessment    Type and Reason for Visit:  Reassess    Nutrition Recommendations/Plan:   Will continue to provide Regular diet along with 1 Ensure daily     Malnutrition Assessment:  Malnutrition Status:  Moderate malnutrition (01/24/24 1434)    Context:  Social/Environmental Circumstances     Findings of the 6 clinical characteristics of malnutrition:  Energy Intake:  50% or less estimated energy requirements for 1 month or longer  Weight Loss:  Unable to assess (no wt history available)     Body Fat Loss:   (moderate) Buccal region   Muscle Mass Loss:   (moderate) Hand (interosseous), Calf (gastrocnemius)  Fluid Accumulation:  No significant fluid accumulation     Strength:  Not Performed    Nutrition Assessment:    Pt continues to meet nutrition needs with po intake around 100%. She is drinking 1 Ensure daily.    Nutrition Related Findings:    no edema, Labs/meds: Reviewed, BM 2/4 Wound Type: None       Current Nutrition Intake & Therapies:    Average Meal Intake: %  Average Supplements Intake: 26-50%  ADULT DIET; Regular  ADULT ORAL NUTRITION SUPPLEMENT; Breakfast; Low Calorie/High Protein Oral Supplement    Anthropometric Measures:  Height: 152.4 cm (5')  Ideal Body Weight (IBW): 100 lbs (45 kg)    Admission Body Weight: 51.7 kg (114 lb)  Current Body Weight: 59.9 kg (132 lb), 132 % IBW. Weight Source: Bed Scale  Current BMI (kg/m2): 25.8                          BMI Categories: Overweight (BMI 25.0-29.9)    Estimated Daily Nutrient Needs:  Energy Requirements Based On: Formula  Weight Used for Energy Requirements: Current  Energy (kcal/day): [place] x 1.2= 1100 kcal (0149-3147 kcal for wt gain)  Weight Used for Protein Requirements: Current  Protein (g/day): 1.5g/kg= 90 g  Method Used for Fluid Requirements: 1 ml/kcal    Nutrition Diagnosis:   Moderate malnutrition related to psychological cause or life stress as evidenced by Criteria as identified in malnutrition 
Date:   1/21/2024  Patient name: Scarlett Ferguson  Date of admission:  1/17/2024  7:52 PM  MRN:   [id number]  YOB: 1931  PCP: No primary care provider on file.    Reason for Admission: Hypokalemia [E87.6]  Hypertensive urgency [I16.0]    Cardiology follow-up       Referring physician: Dr Cherri Mattson     Impression     Episodes of nonsustained bradycardia, frequent PAC, nonsustained atrial bigeminy  1 episode of PSVT 20 beats heart rate 154 AVNRT on 1-18-24 at 1324  No history of coronary intervention, no exertional angina  No history of TIA or CVA no history of syncope  Normal LV systolic function ejection fraction 55 to 60%  Mild pulmonary hypertension right ventricle systolic pressure 47 mmHg  Degenerative joint disease, osteoarthritis both knees  Poor mobility  Anemia normal MCV, hemoglobin 9.8 on 1-20-24  Normal thyroid function test TSH 2.5     Investigation workup     ECG 1/17/2024 at 21:35  Sinus bradycardia, PAC/A-fib with slow ventricular response  ECG 1/18/2024  Supraventricular tachycardia AVNRT, heart rate 154, retrograde P wave, nonspecific ST abnormality     2D echo 1/18/2024  Normal LV size, normal wall thickness, ejection fraction 55 to 60%  Mild to moderate mitral regurgitation  Right ventricle systolic pressure 47 mmHg  Dilated LA     Chest x-ray 1/17/2024  No acute cardiopulmonary disease    History of present illness  89 years old female who lives alone with multiple cats got hospitalized on 1/17/2024.  She was brought to the emergency department by EMS after she was found by a lactation living and deplorable condition.  Apparently she had called the  because she only had 1 working light in her house and also did not have heat.  She was using his space heater and was too weak to get up from the chair in her home.  The home as described as extremely unkempt with hardening type collateral and multiple cat.  Patient presented to the ED with soiled clothing and poor hygiene.  
Date:   1/22/2024  Patient name: Scarlett Ferguson  Date of admission:  1/17/2024  7:52 PM  MRN:   [id number]  YOB: 1931  PCP: No primary care provider on file.    Reason for Admission: Hypokalemia [E87.6]  Hypertensive urgency [I16.0]    Cardiology follow-up: episodes of bradycardia while resting, episodes of supraventricular tachycardia, nonsustained atrial bigeminy, frequent PACs:       Referring physician: Dr Cherri Mattson     Impression     Episodes of nonsustained bradycardia, frequent PAC, nonsustained atrial bigeminy  1 episode of PSVT 20 beats heart rate 154 AVNRT on 1-18-24 at 1324  No history of coronary intervention, no exertional angina  No history of TIA or CVA no history of syncope  Normal LV systolic function ejection fraction 55 to 60%  Mild pulmonary hypertension right ventricle systolic pressure 47 mmHg  Degenerative joint disease, osteoarthritis both knees  Poor mobility  Left eye conjunctivitis  Anemia normal MCV, hemoglobin 9.8 on 1-20-24  Normal thyroid function test TSH 2.5    Investigation workup     ECG 1/17/2024 at 21:35  Sinus bradycardia, PAC/A-fib with slow ventricular response  ECG 1/18/2024  Supraventricular tachycardia AVNRT, heart rate 154, retrograde P wave, nonspecific ST abnormality     2D echo 1/18/2024  Normal LV size, normal wall thickness, ejection fraction 55 to 60%  Mild to moderate mitral regurgitation  Right ventricle systolic pressure 47 mmHg  Dilated LA     Chest x-ray 1/17/2024  No acute cardiopulmonary disease     History of present illness  89 years old female who lives alone with multiple cats got hospitalized on 1/17/2024.  She was brought to the emergency department by EMS after she was found by a lactation living and deplorable condition.  Apparently she had called the  because she only had 1 working light in her house and also did not have heat.  She was using his space heater and was too weak to get up from the chair in her home.  The home as 
Date:   1/24/2024  Patient name: Scarlett Ferguson  Date of admission:  1/17/2024  7:52 PM  MRN:   [id number]  YOB: 1931  PCP: No primary care provider on file.    Reason for Admission: Hypokalemia [E87.6]  Hypertensive urgency [I16.0]    Cardiology follow-up: episodes of bradycardia while resting, episodes of supraventricular tachycardia, nonsustained atrial bigeminy, frequent PACs:        Referring physician: Dr Cherri Mattson     Impression     Episodes of nonsustained bradycardia, frequent PAC, nonsustained atrial bigeminy  1 episode of PSVT 20 beats heart rate 154 AVNRT on 1-18-24 at 1324  No history of coronary intervention, no exertional angina  No history of TIA or CVA no history of syncope  Normal LV systolic function ejection fraction 55 to 60%  Mild pulmonary hypertension right ventricle systolic pressure 47 mmHg  Degenerative joint disease, osteoarthritis both knees  Poor mobility  Left eye conjunctivitis  Anemia normal MCV, hemoglobin 9.8 on 1-20-24  Normal thyroid function test TSH 2.5  Hiatal hernia  Investigation workup    CT chest without contrast 1/21/2024    1. Elongated innominate artery, likely responsible for the suspected 2 cm  apical RUL nodular density on CXR.  2. Mild cardiomegaly with some Kerley lines and trace-small effusions;  correlate for mild compensated CHF.  3. 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  4. Dependent/atelectatic changes both lung bases/posteriorly and additional  findings, as above.     ECG 1/17/2024 at 21:35  Sinus bradycardia, PAC/A-fib with slow ventricular response  ECG 1/18/2024  Supraventricular tachycardia AVNRT, heart rate 154, retrograde P wave, nonspecific ST abnormality     2D echo 1/18/2024  Normal LV size, normal wall thickness, ejection fraction 55 to 60%  Mild to moderate mitral regurgitation  Right ventricle systolic pressure 47 mmHg  Dilated LA     Chest x-ray 1/17/2024  No acute cardiopulmonary disease    History 
Date:   1/25/2024  Patient name: Scarlett Ferguson  Date of admission:  1/17/2024  7:52 PM  MRN:   [id number]  YOB: 1931  PCP: No primary care provider on file.    Reason for Admission: Hypokalemia [E87.6]  Hypertensive urgency [I16.0]    Cardiology follow-up: episodes of bradycardia while resting, episodes of supraventricular tachycardia, nonsustained atrial bigeminy, frequent PACs:        Referring physician: Dr Cherri Mattson     Impression     Episodes of nonsustained bradycardia, frequent PAC, nonsustained atrial bigeminy  1 episode of PSVT 20 beats heart rate 154 AVNRT on 1-18-24 at 1324  No history of coronary intervention, no exertional angina  No history of TIA or CVA no history of syncope  Normal LV systolic function ejection fraction 55 to 60%  Mild pulmonary hypertension right ventricle systolic pressure 47 mmHg  Degenerative joint disease, osteoarthritis both knees  Poor mobility  Left eye conjunctivitis  Anemia normal MCV, hemoglobin 9.8 on 1-20-24  Normal thyroid function test TSH 2.5  Hiatal hernia  Investigation workup     CT chest without contrast 1/21/2024    1. Elongated innominate artery, likely responsible for the suspected 2 cm  apical RUL nodular density on CXR.  2. Mild cardiomegaly with some Kerley lines and trace-small effusions;  correlate for mild compensated CHF.  3. 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  4. Dependent/atelectatic changes both lung bases/posteriorly and additional  findings, as above.     ECG 1/17/2024 at 21:35  Sinus bradycardia, PAC/A-fib with slow ventricular response  ECG 1/18/2024  Supraventricular tachycardia AVNRT, heart rate 154, retrograde P wave, nonspecific ST abnormality     2D echo 1/18/2024  Normal LV size, normal wall thickness, ejection fraction 55 to 60%  Mild to moderate mitral regurgitation  Right ventricle systolic pressure 47 mmHg  Dilated LA     Chest x-ray 1/17/2024  No acute cardiopulmonary disease     History 
Date:   1/27/2024  Patient name: Scarlett Ferguson  Date of admission:  1/17/2024  7:52 PM  MRN:   [id number]  YOB: 1931  PCP: No primary care provider on file.    Reason for Admission: Hypokalemia [E87.6]  Hypertensive urgency [I16.0]    Cardiology follow-up: episodes of bradycardia while resting, episodes of supraventricular tachycardia, nonsustained atrial bigeminy, frequent PACs:         Referring physician: Dr Cherri Mattson     Impression     Episodes of nonsustained bradycardia, frequent PAC, nonsustained atrial bigeminy  1 episode of PSVT 20 beats heart rate 154 AVNRT on 1-18-24 at 1324  No history of coronary intervention, no exertional angina  No history of TIA or CVA no history of syncope  Normal LV systolic function ejection fraction 55 to 60%  Mild pulmonary hypertension right ventricle systolic pressure 47 mmHg  Degenerative joint disease, osteoarthritis both knees  Poor mobility  Left eye conjunctivitis  Anemia normal MCV, hemoglobin 9.8 on 1-20-24  Normal thyroid function test TSH 2.5  Hiatal hernia  Investigation workup     CT chest without contrast 1/21/2024    1. Elongated innominate artery, likely responsible for the suspected 2 cm  apical RUL nodular density on CXR.  2. Mild cardiomegaly with some Kerley lines and trace-small effusions;  correlate for mild compensated CHF.  3. 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  4. Dependent/atelectatic changes both lung bases/posteriorly and additional  findings, as above.     ECG 1/17/2024 at 21:35  Sinus bradycardia, PAC/A-fib with slow ventricular response  ECG 1/18/2024  Supraventricular tachycardia AVNRT, heart rate 154, retrograde P wave, nonspecific ST abnormality     2D echo 1/18/2024  Normal LV size, normal wall thickness, ejection fraction 55 to 60%  Mild to moderate mitral regurgitation  Right ventricle systolic pressure 47 mmHg  Dilated LA     Chest x-ray 1/17/2024  No acute cardiopulmonary disease    History 
Date:   1/29/2024  Patient name: Scarlett Ferguson  Date of admission:  1/17/2024  7:52 PM  MRN:   [id number]  YOB: 1931  PCP: No primary care provider on file.    Reason for Admission: Hypokalemia [E87.6]  Hypertensive urgency [I16.0]    Cardiology follow-up: episodes of bradycardia while resting, episodes of supraventricular tachycardia, nonsustained atrial bigeminy, frequent PACs:         Referring physician: Dr Cherri Mattson     Impression     Episodes of nonsustained bradycardia, frequent PAC, nonsustained atrial bigeminy  1 episode of PSVT 20 beats heart rate 154 AVNRT on 1-18-24 at 1324  No history of coronary intervention, no exertional angina  No history of TIA or CVA no history of syncope  Normal LV systolic function ejection fraction 55 to 60%  Mild pulmonary hypertension right ventricle systolic pressure 47 mmHg  Degenerative joint disease, osteoarthritis both knees  Poor mobility  Left eye conjunctivitis  Anemia normal MCV, hemoglobin 9.8 on 1-20-24  Normal thyroid function test TSH 2.5  Hiatal hernia    Investigation workup    CT chest without contrast 1/21/2024     1. Elongated innominate artery, likely responsible for the suspected 2 cm  apical RUL nodular density on CXR.  2. Mild cardiomegaly with some Kerley lines and trace-small effusions;  correlate for mild compensated CHF.  3. 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  4. Dependent/atelectatic changes both lung bases/posteriorly and additional  findings, as above.     ECG 1/17/2024 at 21:35  Sinus bradycardia, PAC/A-fib with slow ventricular response  ECG 1/18/2024  Supraventricular tachycardia AVNRT, heart rate 154, retrograde P wave, nonspecific ST abnormality     2D echo 1/18/2024  Normal LV size, normal wall thickness, ejection fraction 55 to 60%  Mild to moderate mitral regurgitation  Right ventricle systolic pressure 47 mmHg  Dilated LA     Chest x-ray 1/17/2024  No acute cardiopulmonary 
Date:   1/30/2024  Patient name: Scarlett Ferguson  Date of admission:  1/17/2024  7:52 PM  MRN:   [id number]  YOB: 1931  PCP: No primary care provider on file.    Reason for Admission: Hypokalemia [E87.6]  Hypertensive urgency [I16.0]     Cardiology follow-up: episodes of bradycardia while resting, episodes of supraventricular tachycardia, nonsustained atrial bigeminy, frequent PACs:        Referring physician: Dr Cherri Mattson     Impression     Episodes of nonsustained bradycardia, frequent PAC, nonsustained atrial bigeminy  1 episode of PSVT 20 beats heart rate 154 AVNRT on 1-18-24 at 1324  No history of coronary intervention, no exertional angina  No history of TIA or CVA no history of syncope  Normal LV systolic function ejection fraction 55 to 60%  Mild pulmonary hypertension right ventricle systolic pressure 47 mmHg  Degenerative joint disease, osteoarthritis both knees  Poor mobility  Impaired hearing  Left eye conjunctivitis  Anemia normal MCV, hemoglobin 9.8 on 1-20-24  Normal thyroid function test TSH 2.5  Hiatal hernia     Investigation workup     CT chest without contrast 1/21/2024     1. Elongated innominate artery, likely responsible for the suspected 2 cm  apical RUL nodular density on CXR.  2. Mild cardiomegaly with some Kerley lines and trace-small effusions;  correlate for mild compensated CHF.  3. 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  4. Dependent/atelectatic changes both lung bases/posteriorly and additional  findings, as above.    ECG 1/17/2024 at 21:35  Sinus bradycardia, PAC/A-fib with slow ventricular response  ECG 1/18/2024  Supraventricular tachycardia AVNRT, heart rate 154, retrograde P wave, nonspecific ST abnormality     2D echo 1/18/2024  Normal LV size, normal wall thickness, ejection fraction 55 to 60%  Mild to moderate mitral regurgitation  Right ventricle systolic pressure 47 mmHg  Dilated LA     Chest x-ray 1/17/2024  No acute 
Date:   1/31/2024  Patient name: Scarlett Ferguson  Date of admission:  1/17/2024  7:52 PM  MRN:   [id number]  YOB: 1931  PCP: No primary care provider on file.    Reason for Admission: Hypokalemia [E87.6]  Hypertensive urgency [I16.0]     Cardiology follow-up: episodes of bradycardia while resting, episodes of supraventricular tachycardia, nonsustained atrial bigeminy, frequent PACs        Referring physician: Dr Cherri Mattson     Impression     Episodes of nonsustained bradycardia, frequent PAC, nonsustained atrial bigeminy  1 episode of PSVT 20 beats heart rate 154 AVNRT on 1-18-24 at 1324  No history of coronary intervention, no exertional angina  No history of TIA or CVA no history of syncope  Normal LV systolic function ejection fraction 55 to 60%  Mild pulmonary hypertension right ventricle systolic pressure 47 mmHg  Degenerative joint disease, osteoarthritis both knees  Poor mobility  Impaired hearing  Left eye conjunctivitis  Anemia normal MCV, hemoglobin 9.8 on 1-20-24  Normal thyroid function test TSH 2.5  Hiatal hernia     Investigation workup    Investigation workup     CT chest without contrast 1/21/2024     1. Elongated innominate artery, likely responsible for the suspected 2 cm  apical RUL nodular density on CXR.  2. Mild cardiomegaly with some Kerley lines and trace-small effusions;  correlate for mild compensated CHF.  3. 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  4. Dependent/atelectatic changes both lung bases/posteriorly and additional  findings, as above.     ECG 1/17/2024 at 21:35  Sinus bradycardia, PAC/A-fib with slow ventricular response  ECG 1/18/2024  Supraventricular tachycardia AVNRT, heart rate 154, retrograde P wave, nonspecific ST abnormality     2D echo 1/18/2024  Normal LV size, normal wall thickness, ejection fraction 55 to 60%  Mild to moderate mitral regurgitation  Right ventricle systolic pressure 47 mmHg  Dilated LA    Chest x-ray 
Date:   2/1/2024  Patient name: Scarlett Ferguson  Date of admission:  1/17/2024  7:52 PM  MRN:   [id number]  YOB: 1931  PCP: No primary care provider on file.    Reason for Admission: Hypokalemia [E87.6]  Hypertensive urgency [I16.0]    Cardiology follow-up: episodes of bradycardia while resting, episodes of supraventricular tachycardia, nonsustained atrial bigeminy, frequent PACs         Referring physician: Dr Cherri Mattson     Impression     Episodes of nonsustained bradycardia, frequent PAC, nonsustained atrial bigeminy  1 episode of PSVT 20 beats heart rate 154 AVNRT on 1-18-24 at 1324  No history of coronary intervention, no exertional angina  No history of TIA or CVA no history of syncope  Normal LV systolic function ejection fraction 55 to 60%  Mild pulmonary hypertension right ventricle systolic pressure 47 mmHg  Degenerative joint disease, osteoarthritis both knees  Poor mobility  Impaired hearing  Left eye conjunctivitis  Anemia normal MCV, hemoglobin 9.8 on 1-20-24  Normal thyroid function test TSH 2.5  Hiatal hernia    Investigation workup     CT chest without contrast 1/21/2024     1. Elongated innominate artery, likely responsible for the suspected 2 cm  apical RUL nodular density on CXR.  2. Mild cardiomegaly with some Kerley lines and trace-small effusions;  correlate for mild compensated CHF.  3. 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  4. Dependent/atelectatic changes both lung bases/posteriorly and additional  findings, as above.     ECG 1/17/2024 at 21:35  Sinus bradycardia, PAC/A-fib with slow ventricular response  ECG 1/18/2024  Supraventricular tachycardia AVNRT, heart rate 154, retrograde P wave, nonspecific ST abnormality     2D echo 1/18/2024  Normal LV size, normal wall thickness, ejection fraction 55 to 60%  Mild to moderate mitral regurgitation  Right ventricle systolic pressure 47 mmHg  Dilated LA     Chest x-ray 1/17/2024  No acute cardiopulmonary 
Date:   2/3/2024  Patient name: Scarlett Ferguson  Date of admission:  1/17/2024  7:52 PM  MRN:   [id number]  YOB: 1931  PCP: No primary care provider on file.    Reason for Admission: Hypokalemia [E87.6]  Hypertensive urgency [I16.0]    Cardiology follow-up: episodes of bradycardia while resting, episodes of supraventricular tachycardia, nonsustained atrial bigeminy, frequent PACs           Referring physician: Dr Cherri Mattson     Impression     Episodes of nonsustained bradycardia, frequent PAC, nonsustained atrial bigeminy  1 episode of PSVT 20 beats heart rate 154 AVNRT on 1-18-24 at 1324  No history of coronary intervention, no exertional angina  No history of TIA or CVA no history of syncope  Normal LV systolic function ejection fraction 55 to 60%  Mild pulmonary hypertension right ventricle systolic pressure 47 mmHg  Degenerative joint disease, osteoarthritis both knees  Poor mobility  Impaired hearing  Impaired memory recent and remote  Left eye conjunctivitis  Anemia normal MCV, hemoglobin 9.8 on 1-20-24  Normal thyroid function test TSH 2.5  Hiatal hernia    Investigation workup     CT chest without contrast 1/21/2024     1. Elongated innominate artery, likely responsible for the suspected 2 cm  apical RUL nodular density on CXR.  2. Mild cardiomegaly with some Kerley lines and trace-small effusions;  correlate for mild compensated CHF.  3. 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  4. Dependent/atelectatic changes both lung bases/posteriorly and additional  findings, as above     ECG 1/17/2024 at 21:35  Sinus bradycardia, PAC/A-fib with slow ventricular response  ECG 1/18/2024  Supraventricular tachycardia AVNRT, heart rate 154, retrograde P wave, nonspecific ST abnormality     2D echo 1/18/2024  Normal LV size, normal wall thickness, ejection fraction 55 to 60%  Mild to moderate mitral regurgitation  Right ventricle systolic pressure 47 mmHg  Dilated LA     Chest x-ray 
Date:   2/4/2024  Patient name: Scarlett Ferguson  Date of admission:  1/17/2024  7:52 PM  MRN:   [id number]  YOB: 1931  PCP: No primary care provider on file.    Reason for Admission: Hypokalemia [E87.6]  Hypertensive urgency [I16.0]    Cardiology follow-up: episodes of bradycardia while resting, episodes of supraventricular tachycardia, nonsustained atrial bigeminy, frequent PACs            Referring physician: Dr Cherri Mattson     Impression     Episodes of nonsustained bradycardia, frequent PAC, nonsustained atrial bigeminy  1 episode of PSVT 20 beats heart rate 154 AVNRT on 1-18-24 at 1324  No history of coronary intervention, no exertional angina  No history of TIA or CVA no history of syncope  Normal LV systolic function ejection fraction 55 to 60%  Mild pulmonary hypertension right ventricle systolic pressure 47 mmHg  Degenerative joint disease, osteoarthritis both knees  Poor mobility  Impaired hearing  Impaired memory recent and remote  Left eye conjunctivitis  Anemia normal MCV, hemoglobin 9.8 on 1-20-24  Normal thyroid function test TSH 2.5  Hiatal hernia     Investigation workup     CT chest without contrast 1/21/2024     1. Elongated innominate artery, likely responsible for the suspected 2 cm  apical RUL nodular density on CXR.  2. Mild cardiomegaly with some Kerley lines and trace-small effusions;  correlate for mild compensated CHF.  3. 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  4. Dependent/atelectatic changes both lung bases/posteriorly and additional  findings, as above    ECG 1/17/2024 at 21:35  Sinus bradycardia, PAC/A-fib with slow ventricular response  ECG 1/18/2024  Supraventricular tachycardia AVNRT, heart rate 154, retrograde P wave, nonspecific ST abnormality     2D echo 1/18/2024  Normal LV size, normal wall thickness, ejection fraction 55 to 60%  Mild to moderate mitral regurgitation  Right ventricle systolic pressure 47 mmHg  Dilated LA     Chest 
Did peer to peer discussion with the [facility] physician who is a physiatrist  Patient was denied for inpatient skilled facility  Her review of notes states patient is mobilizing more than 50 feet with a rolling walker no IV antibiotics no complex wound care hence patient did not qualify  And the physician recommended to  works on safe discharge planning to long-term facility assisted living or something similar  Yevgeniy Aguilar MD  2/4/2024    
Discharge to facility hold Rx per Edwina ROJAS Care Coordination [facility] 2/1/24 10:34am kevyn  
Dr kessler notified of hr 150-160's, currently 160 sitting inchair stating she feels fine. He's placing orders for EKG and trop, and one time med    Dr kessler notified of EKG result:  EKG result: supraventricular tachycardia, marked ST abnormality, possible inferior subendocardial injury.    Dr Kessler notified that pt flipped on her own after 15 min of SVT around 160's back to sinus arhythmia hr 89.     New consult for dr platt was placed. Spoke to dr platt regarding the above. Ok to hold cardizem dt pt no longer in SVT. Ok to give lopressor. He will see pt today  
Elsa Mcdonnell, ABBY   Patient:  SAM RODNEY   YOB: 1931  MRN:  [id number]  Location: [facility]    2108-01 1/18/24 12:11 PM   [id number] Hospital or Facility: [facility] [place] PRO CARE From: Tessa Lanza RE: SAM RODNEY RM: 2108-01 Goals of Care  Unrea   
[facility]  IN-PATIENT SERVICE  [facility]    PROGRESS NOTE             Date:   1/19/2024  Patient name:  Scarlett Ferguson  Date of admission:  1/17/2024  7:52 PM  MRN:   [id number]  YOB: 1931    INTERVAL HISTORY:      SUBJECTIVE:  Frail lady laying in bed, currently denies concerns.  Denies chest pain, no palpitations.    No other concerns.    Objective   Vitals:    01/19/24 0345 01/19/24 0730 01/19/24 0900 01/19/24 1200   BP: (!) 135/50 137/62  129/85   Pulse: 54 (!) 45 86 (!) 39   Resp: 18 18  18   Temp: 98 °F (36.7 °C) 98 °F (36.7 °C)  97.5 °F (36.4 °C)   TempSrc: Oral Axillary  Oral   SpO2: 93% 95%  96%   Weight:       Height:         BP Readings from Last 6 Encounters:   01/19/24 129/85        No intake or output data in the 24 hours ending 01/19/24 1600  General appearance: Poorly nourished  HEENT: Normocephalic, no lesions, without obvious abnormality.pupils equal and reactive, EOM normal  Lungs: clear to auscultation bilaterally  Heart: regular rate and rhythm, S1, S2 normal, no murmur, click, rub or gallop  Abdomen: soft, non-tender; bowel sounds normal; no masses,  no organomegaly  Extremities: extremities normal, atraumatic, no cyanosis or edema. Pulses 2+ and symmetrical in all 4 extremities  Neurological: Alert oriented x 3, moves all 4 extremities spontaneously.  Makes adequate conversation    CBC:   Recent Labs     01/17/24 2036 01/18/24  0538 01/19/24  0506   WBC 7.2 5.4 4.6   HGB 12.4 10.2* 9.6*    283 235     BMP:    Recent Labs     01/17/24 2036 01/18/24  0538 01/19/24  0506    144 143   K 3.3* 3.7 3.7    109* 110*   CO2 28 26 25   BUN 21 24* 26*   CREATININE 0.6 0.5 0.6   CALCIUM 9.3 8.4* 8.3*     Magnesium:  Recent Labs     01/19/24  0506   MG 2.0     Recent Labs     01/19/24  0506   AST 26   ALT 26     Phosphorus:No results for input(s): \"PHOS\" in the last 72 hours.  Glucose:No results for input(s): \"POCGLU\" in the 
[facility]  IN-PATIENT SERVICE  [facility]    PROGRESS NOTE             Date:   1/29/2024  Patient name:  Scarlett Ferguson  Date of admission:  1/17/2024  7:52 PM  MRN:   [id number]  YOB: 1931    INTERVAL HISTORY:      SUBJECTIVE:  Frail lady laying in bed, currently denies concerns.  Denies chest pain, no palpitations.    No other concerns.    Objective   Vitals:    01/28/24 0604 01/28/24 0713 01/28/24 1820 01/28/24 2014   BP: 99/71 (!) 135/57 (!) 117/42 (!) 109/52   Pulse: 59 77 57 68   Resp: 18  16    Temp: 97.9 °F (36.6 °C)  97.7 °F (36.5 °C)    TempSrc:   Oral    SpO2: 92%  98%    Weight:       Height:         BP Readings from Last 6 Encounters:   01/28/24 (!) 109/52    1/29   Patient clinically doing better  No complaints today  Blood pressure better controlled      No intake or output data in the 24 hours ending 01/29/24 1404    General appearance: Poorly nourishedleft eye eversion  HEENT: Normocephalic, no lesions, without obvious abnormality.pupils equal and reactive, EOM normal  Lungs: clear to auscultation bilaterally  Heart: regular rate and rhythm, S1, S2 normal, no murmur, click, rub or gallop  Abdomen: soft, non-tender; bowel sounds normal; no masses,  no organomegaly  Extremities: extremities normal, atraumatic, no cyanosis or edema. Pulses 2+ and symmetrical in all 4 extremities  Neurological: Alert oriented x 3, moves all 4 extremities spontaneously.  Makes adequate conversation    CBC:   No results for input(s): \"WBC\", \"HGB\", \"PLT\" in the last 72 hours.    BMP:    No results for input(s): \"NA\", \"K\", \"CL\", \"CO2\", \"BUN\", \"CREATININE\", \"CALCIUM\" in the last 72 hours.  Magnesium:  No results for input(s): \"MG\" in the last 72 hours.    No results for input(s): \"AST\", \"ALT\", \"ALB\", \"TP\" in the last 72 hours.    Invalid input(s): \"ALP\", \"TBIL\", \"BILID\"    Phosphorus:No results for input(s): \"PHOS\" in the last 72 hours.  Glucose:No results for 
[facility]  IN-PATIENT SERVICE  [facility]    PROGRESS NOTE             Date:   2/4/2024  Patient name:  Scarlett Ferguson  Date of admission:  1/17/2024  7:52 PM  MRN:   [id number]  YOB: 1931    INTERVAL HISTORY:      SUBJECTIVE:  Frail lady laying in bed, currently denies concerns.  Denies chest pain, no palpitations.    No other concerns.    Objective   Vitals:    02/02/24 2043 02/03/24 0606 02/03/24 1812 02/04/24 0554   BP: (!) 141/42 (!) 124/55 136/62 133/61   Pulse: 75 (!) 45 66 59   Resp: 18 16 18 18   Temp: 98.2 °F (36.8 °C) 97.4 °F (36.3 °C) 98 °F (36.7 °C) 98.2 °F (36.8 °C)   TempSrc:   Oral Oral   SpO2: 97% 94% 97% 95%   Weight:       Height:         BP Readings from Last 6 Encounters:   02/04/24 133/61    1/29   Patient clinically doing better  No complaints today  Blood pressure better controlled      No intake or output data in the 24 hours ending 02/04/24 0952      General appearance: Poorly nourishedleft eye eversion  HEENT: Normocephalic, no lesions, without obvious abnormality.pupils equal and reactive, EOM normal  Lungs: clear to auscultation bilaterally  Heart: regular rate and rhythm, S1, S2 normal, no murmur, click, rub or gallop  Abdomen: soft, non-tender; bowel sounds normal; no masses,  no organomegaly  Extremities: extremities normal, atraumatic, no cyanosis or edema. Pulses 2+ and symmetrical in all 4 extremities  Neurological: Alert oriented x 3, moves all 4 extremities spontaneously.  Makes adequate conversation  Severe deformities in both hands and knees from osteoarthritis  CBC:   No results for input(s): \"WBC\", \"HGB\", \"PLT\" in the last 72 hours.      BMP:    No results for input(s): \"NA\", \"K\", \"CL\", \"CO2\", \"BUN\", \"CREATININE\", \"CALCIUM\" in the last 72 hours.    Magnesium:  No results for input(s): \"MG\" in the last 72 hours.    No results for input(s): \"AST\", \"ALT\", \"ALB\", \"TP\" in the last 72 hours.    Invalid input(s): \"ALP\", 
Neighbor amish called to check on pt. Pt stated she is ok to give info to and she wanted her added to her emergency contacts.   Amish was added as emergency contact.   
Notified Dr. Shaikh that patient's insurance denied SNF and would need peer to peer.  Peer to peer would need to be completed by 1200 pm on Monday 2/5/24. Dr. Shaikh stated she would complete it on Monday.   
Occupational Therapy  [place]   OCCUPATIONAL THERAPY MISSED TREATMENT NOTE   INPATIENT   Date: 24  Patient Name: Scarlett Ferguson       Room:   MRN: [id number]   Account #: [id number]    : 11/3/1931  (89 y.o.)  Gender: female   Referring Practitioner: Cherri Mattson MD  Diagnosis: Hypertensive urgency             REASON FOR MISSED TREATMENT:  Patient declined   -    pt lying in bed watching tv as writer arrives. Pt politely declines tx 2* fatigue. Will continue to follow for OT needs.        Electronically signed by SOURAV More on 24 at 4:42 PM EST  
Occupational Therapy  [facility]   INPATIENT OCCUPATIONAL THERAPY  PROGRESS NOTE  Date: 2024  Patient Name: Scarlett Ferguson       Room:   MRN: [id number]    : 11/3/1931  (89 y.o.)  Gender: female   Referring Practitioner: Cherri Mattson MD  Diagnosis: Hypertensive urgency      Discharge Recommendations:  Further Occupational Therapy is recommended upon facility discharge.    OT Equipment Recommendations  Other: TBD    Restrictions/Precautions  Restrictions/Precautions  Restrictions/Precautions: Fall Risk;General Precautions  Required Braces or Orthoses?: No  Implants present? :  (pt denies)  Position Activity Restriction  Other position/activity restrictions: up with assistance    O2 Device: None (Room air)    Subjective  Subjective  Subjective: \"pt states that she is happy that her neighbor is taking care of her cats. \" Pt is pleasant and cooperative for therapy.  Subjective  Pain: denies  Comments: pt alert and cooperative    Objective  Orientation  Overall Orientation Status: Within Functional Limits  Cognition  Overall Cognitive Status: Exceptions  Arousal/Alertness: Appropriate responses to stimuli  Following Commands: Follows all commands without difficulty  Attention Span: Attends with cues to redirect  Memory: Appears intact  Safety Judgement: Decreased awareness of need for assistance;Decreased awareness of need for safety  Problem Solving: Assistance required to identify errors made;Assistance required to generate solutions  Insights: Decreased awareness of deficits  Initiation: Requires cues for some  Sequencing: Does not require cues    Activities of Daily Living  ADL  Feeding: Independent  Grooming: Stand by assistance  UE Bathing: Stand by assistance  LE Bathing: Stand by assistance  UE Dressing: Stand by assistance  LE Dressing: Stand by assistance  Toileting: Stand by assistance  Additional Comments: ADL scores based on skilled observation and clinical 
Occupational Therapy  [facility]   INPATIENT OCCUPATIONAL THERAPY  PROGRESS NOTE  Date: 2024  Patient Name: Scarlett Ferguson       Room:   MRN: [id number]    : 11/3/1931  (89 y.o.)  Gender: female   Referring Practitioner: Cherri Mattson MD  Diagnosis: Hypertensive urgency      Discharge Recommendations:  Further Occupational Therapy is recommended upon facility discharge.    OT Equipment Recommendations  Other: TBD    Restrictions/Precautions  Restrictions/Precautions  Restrictions/Precautions: Fall Risk;General Precautions  Required Braces or Orthoses?: No  Implants present? :  (pt denies)  Position Activity Restriction  Other position/activity restrictions: up with assistance    O2 Device: None (Room air)    Subjective  Subjective  Subjective: \"I sit down to brush my teeth at home\" pt states  Subjective  Pain: pt denies pain at rest  Comments: WYATT hawthorne'christina pt for occupational therapy. Pt agreeable to participate and pleasant/cooperative throughout.    Objective  Cognition  Overall Cognitive Status: Exceptions  Arousal/Alertness: Appropriate responses to stimuli  Following Commands: Follows one step commands with repetition;Follows one step commands with increased time  Attention Span: Attends with cues to redirect  Memory: Appears intact  Safety Judgement: Decreased awareness of need for assistance;Decreased awareness of need for safety  Problem Solving: Assistance required to correct errors made;Decreased awareness of errors;Assistance required to identify errors made;Assistance required to generate solutions;Assistance required to implement solutions  Insights: Decreased awareness of deficits  Initiation: Requires cues for some  Sequencing: Does not require cues  Cognition Comment: Pt also very [place], requiring consistent cues for safety    Activities of Daily Living  ADL  Feeding: Setup  Feeding Skilled Clinical Factors: requires A to open bottled water d/t arthritis/dec 
Occupational Therapy  [facility]   Occupational Therapy Evaluation  Date: 24  Patient Name: Scarlett Ferguson       Room: 2108-01  MRN: [id number]  Account: [id number]   : 11/3/1931  (89 y.o.) Gender: female     Discharge Recommendations:  Further Occupational Therapy is recommended upon facility discharge.    OT Equipment Recommendations  Other: TBD    Referring Practitioner: Cherri Mattson MD  Diagnosis: Hypertensive urgency Additional Pertinent Hx: Per chart: Scarlett Ferguson is a 89 y.o. female who presents via EMS due to concerns for poor living conditions.  Patient lives at home in a private home by herself.  Patient called an  because she only has 1 working at light in her house currently.  Is using a space heater to heat her home.  Apparently has no other working electricity at this time.   was  concern for patient's wellbeing and called EMS.  EMS found the patient's home to be extremely unkempt with multiple cats and a significant amount of clutter.  Patient's clothing was very soiled and she appeared unwell.  Upon arrival, patient voices no complaints.  She is alert and oriented.  States she has not seen a doctor in over 20 years.  Takes no medications every day.    Treatment Diagnosis: impaired self care tasks    Past Medical History:  has no past medical history on file.    Past Surgical History:   has no past surgical history on file.    Restrictions  Restrictions/Precautions  Restrictions/Precautions: Fall Risk, General Precautions  Required Braces or Orthoses?: No  Implants present? :  (pt denies)  Position Activity Restriction  Other position/activity restrictions: up with assistance      Vitals  Vitals  O2 Device: None (Room air)     Subjective  Subjective: \"I just go in a jar at home so I don't have to walk to the bathroom\" pt states in response to using the bathroom.  Comments: WYATT Dixon ok'd pt for OT/PT eval and treat. Pt agreeable to participate 
[facility]  IN-PATIENT SERVICE  [facility]    PROGRESS NOTE             Date:   1/25/2024  Patient name:  Scarlett Ferguson  Date of admission:  1/17/2024  7:52 PM  MRN:   [id number]  YOB: 1931    INTERVAL HISTORY:      SUBJECTIVE:  Frail lady laying in bed, currently denies concerns.  Denies chest pain, no palpitations.    No other concerns.    Objective   Vitals:    01/24/24 1425 01/24/24 1836 01/24/24 2136 01/25/24 0639   BP:  (!) 162/59 (!) 162/59 (!) 132/51   Pulse:  72 72 53   Resp:  19  16   Temp:  97.5 °F (36.4 °C)  97.9 °F (36.6 °C)   TempSrc:  Oral     SpO2:  94%  93%   Weight:       Height: 1.524 m (5')        BP Readings from Last 6 Encounters:   01/25/24 (!) 132/51        No intake or output data in the 24 hours ending 01/25/24 1216    General appearance: Poorly nourishedleft eye eversion  HEENT: Normocephalic, no lesions, without obvious abnormality.pupils equal and reactive, EOM normal  Lungs: clear to auscultation bilaterally  Heart: regular rate and rhythm, S1, S2 normal, no murmur, click, rub or gallop  Abdomen: soft, non-tender; bowel sounds normal; no masses,  no organomegaly  Extremities: extremities normal, atraumatic, no cyanosis or edema. Pulses 2+ and symmetrical in all 4 extremities  Neurological: Alert oriented x 3, moves all 4 extremities spontaneously.  Makes adequate conversation    CBC:   No results for input(s): \"WBC\", \"HGB\", \"PLT\" in the last 72 hours.    BMP:    No results for input(s): \"NA\", \"K\", \"CL\", \"CO2\", \"BUN\", \"CREATININE\", \"CALCIUM\" in the last 72 hours.    Magnesium:  No results for input(s): \"MG\" in the last 72 hours.    No results for input(s): \"AST\", \"ALT\", \"ALB\", \"TP\" in the last 72 hours.    Invalid input(s): \"ALP\", \"TBIL\", \"BILID\"    Phosphorus:No results for input(s): \"PHOS\" in the last 72 hours.  Glucose:No results for input(s): \"POCGLU\" in the last 72 hours.  Hemoglobin a1cNo results found for: 
Patient in good spirits and talked about being discharged home; patient welcomed prayer;      01/23/24 1901   Encounter Summary   Encounter Overview/Reason  Spiritual/Emotional Needs   Service Provided For: Patient   Referral/Consult From: Palliative Care   Last Encounter  01/23/24   Complexity of Encounter Low   Spiritual/Emotional needs   Type Spiritual Support   Palliative Care   Type Palliative Care, Follow-up   Assessment/Intervention/Outcome   Assessment Hopeful   Intervention Explored/Affirmed feelings, thoughts, concerns;Prayer (assurance of)/[place];Sustaining Presence/Ministry of presence;Discussed illness injury and it’s impact   Outcome Engaged in conversation;Expressed feelings, needs, and concerns;Expressed Gratitude;Receptive       
Patient resting but provided medical update and welcomed prayer;      02/02/24 2012   Encounter Summary   Encounter Overview/Reason  Spiritual/Emotional Needs   Service Provided For: Patient   Referral/Consult From: Palliative Care   Last Encounter  02/02/24   Spiritual/Emotional needs   Type Spiritual Support   Palliative Care   Type Palliative Care, Follow-up   Assessment/Intervention/Outcome   Assessment Calm;Coping   Intervention Explored/Affirmed feelings, thoughts, concerns;Prayer (assurance of)/[place];Sustaining Presence/Ministry of presence   Outcome Engaged in conversation;Expressed feelings, needs, and concerns;Expressed Gratitude;Receptive       
Patient sitting @ EOB, patent is very pleasant.  She engaged in conversation, she welcomed visit and prayer.  Spiritual care will continue to offer emotional and spiritual care.     02/06/24 1245   Encounter Summary   Encounter Overview/Reason  Spiritual/Emotional Needs   Service Provided For: Patient   Referral/Consult From: Palliative Care   Last Encounter  02/06/24   Complexity of Encounter Low   Spiritual/Emotional needs   Type Spiritual Support   Palliative Care   Type Palliative Care, Follow-up   Assessment/Intervention/Outcome   Assessment Calm;Coping   Intervention Active listening;Explored/Affirmed feelings, thoughts, concerns;Prayer (assurance of)/[place];Sustaining Presence/Ministry of presence   Outcome Engaged in conversation;Receptive       
Patient spoke about why she should be discharged home; patient explained multiple reasons why she could function at home; patient desperate and anxious to go home; patient insists that she go home asap; patient asked writer to \"find her an \"; writer explained to patient that she had a discharge team working on her placement; patient asked writer to \"call an  for her when I left the hospital' ; writer explained to patient that she was not able to make this call on her behalf; writer encouraged patient to call an  herself, and patient stated she was not able to do that because she could not be understood completely on a phone\"; listening presence and support; welcomed prayer; writer spoke with WYATT Mclain , Ramiro Lead in regarding patient's statements and request;     01/21/24 1299   Encounter Summary   Encounter Overview/Reason  Spiritual/Emotional Needs   Service Provided For: Patient   Referral/Consult From: Palliative Care   Last Encounter  01/21/24   Complexity of Encounter Moderate   Spiritual/Emotional needs   Type Emotional Distress   Palliative Care   Type Palliative Care, Follow-up   Assessment/Intervention/Outcome   Assessment Anxious;Hopeful;Powerlessness;Stress overload   Intervention Active listening;Discussed illness injury and it’s impact;Explored/Affirmed feelings, thoughts, concerns;Prayer (assurance of)/[place];Sustaining Presence/Ministry of presence   Outcome Comfort;Engaged in conversation;Expressed feelings, needs, and concerns;Expressed Gratitude;Receptive       
Patient stated she doesn't understand why she is not able to do go home; patient stated she believes she has been discharged and is wondering why she is still at the hospital; patient shared life review; listening presence and support; welcomed prayer; spoke with Mimi RNPETRA regarding patient's statements;     01/24/24 2000   Encounter Summary   Encounter Overview/Reason  Spiritual/Emotional Needs   Service Provided For: Patient   Referral/Consult From: Palliative Care   Last Encounter  01/24/24   Spiritual/Emotional needs   Type Spiritual Support   Palliative Care   Type Palliative Care, Follow-up   Assessment/Intervention/Outcome   Assessment Hopeful;Powerlessness;Anxious   Intervention Active listening;Discussed illness injury and it’s impact;Explored/Affirmed feelings, thoughts, concerns;Life review/[place];Prayer (assurance of)/[place];Sustaining Presence/Ministry of presence   Outcome Coping;Engaged in conversation;Expressed feelings, needs, and concerns;Expressed Gratitude;Receptive       
Patient talked about her \"12 cats\" and her concerns for their care while she is in the hospital; listening presence and support; welcomed prayer; got update from WYATT Burton     01/19/24 2271   Encounter Summary   Encounter Overview/Reason  Spiritual/Emotional Needs   Service Provided For: Patient   Referral/Consult From: Palliative Care   Last Encounter  01/19/24   Complexity of Encounter Moderate   Spiritual/Emotional needs   Type Spiritual Support   Palliative Care   Type Palliative Care, Follow-up   Assessment/Intervention/Outcome   Assessment Coping;Hopeful;Powerlessness   Intervention Active listening;Discussed illness injury and it’s impact;Explored/Affirmed feelings, thoughts, concerns;Prayer (assurance of)/[place];Sustaining Presence/Ministry of presence   Outcome Comfort;Engaged in conversation;Expressed feelings, needs, and concerns;Expressed Gratitude;Receptive       
Per neighbor amish, she stated if we could see her home we would not let pt return to her home as it is very unsafe.  She stated her entire living room is filled with cat litter and used as a cat litter box.   She said there are many bags of cat food are opened spread all over the counters, so the cats can jump up and eat it at any time.   She stated she has 17 cats at the home, as they keep having more kittens.   Neighbor amish stated she helps assist pt to store, bring her some meals, and run errands.   
Physical Therapy          Physical Therapy Cancel Note      DATE: 2024    NAME: Scarlett Ferguson  MRN: [id number]   : 11/3/1931      Patient not seen this date for Physical Therapy due to:    Other: Pt sitting up at EOB eating her lunch upon arrival to room.(1145 am) Ed pt we would re-attempt later if able. Continue to follow pt for therapy needs.      Electronically signed by Jocelyn Chawla PTA on 2024 at 4:04 PM   
Physical Therapy        Physical Therapy Cancel Note      DATE: 2024    NAME: Scarlett Ferguson  MRN: [id number]   : 11/3/1931      Patient not seen this date for Physical Therapy due to:    Patient Declined: Cx; patient declined theraspy services citing \"tired,\" patient reports not sleeping all night. Writer attempted to encourage patient with no success. Will continue to follow for therapy needs.      Electronically signed by Jumana Porter PTA on 2024 at 9:01 AM      
Physical Therapy        Physical Therapy Cancel Note      DATE: 2024    NAME: Scarlett Ferguson  MRN: [id number]   : 11/3/1931      Patient not seen this date for Physical Therapy due to:    Other: Pt sound asleep on writers arrival to room. Pt unable to be aroused with multiple attempts. Will reattempt to see pt as time permits. Attempt made at 1014 .    2nd attempt made at 1116. Pt still sleeping, but able to be aroused this time. Pt reporting not being able to sleep last night, and wishes to rest at this time. Pt states she would be agreeable to an afternoon session but writer will not be available. Writer will send message to other staff if able to see pt but pt notified that it may not happen due to availability. Pt understood and continued to deny at this time.     Electronically signed by Gordo Carvajal PTA on 2024 at 10:19 AM      
Physical Therapy  Facility/Department: [facility] MED SURG  Daily Treatment Note  NAME: Scarlett Ferguson  : 11/3/1931  MRN: [id number]    Date of Service: 2024    Discharge Recommendations:  Therapy recommended at discharge   PT Equipment Recommendations  Equipment Needed: No    Patient Diagnosis(es): The primary encounter diagnosis was Hypokalemia. A diagnosis of Hypertensive urgency was also pertinent to this visit.    Assessment   Activity Tolerance: Patient tolerated treatment well  Equipment Needed: No     Plan    Physical Therapy Plan  General Plan: 5-7 times per week  Current Treatment Recommendations: Strengthening;ROM;Functional mobility training;Balance training;Transfer training;Gait training;Stair training;Endurance training;Home exercise program;Safety education & training;Patient/Caregiver education & training;Equipment evaluation, education, & procurement;Therapeutic activities     Restrictions  Restrictions/Precautions  Restrictions/Precautions: Fall Risk, General Precautions  Required Braces or Orthoses?: No  Implants present? :  (pt denies)  Position Activity Restriction  Other position/activity restrictions: up with assistance     Subjective    Subjective  Subjective: Pt is resting in bed on writers arrival. Pt very pleasant and cooperative with therapy. Pt stating needing to use restroom on start of session.  Pain: Pt denies pain  Orientation  Overall Orientation Status: Within Functional Limits  Orientation Level: Oriented X4  Cognition  Overall Cognitive Status: Exceptions  Arousal/Alertness: Appropriate responses to stimuli  Following Commands: Follows one step commands with repetition;Follows one step commands with increased time  Attention Span: Attends with cues to redirect  Memory: Appears intact  Safety Judgement: Decreased awareness of need for assistance;Decreased awareness of need for safety  Problem Solving: Assistance required to identify errors made;Assistance required to generate 
Physical Therapy  Facility/Department: [facility] MED SURG  Daily Treatment Note  NAME: Scarlett Ferguson  : 11/3/1931  MRN: [id number]    Date of Service: 2024    Discharge Recommendations:  Therapy recommended at discharge        Patient Diagnosis(es): The primary encounter diagnosis was Hypokalemia. A diagnosis of Hypertensive urgency was also pertinent to this visit.     Activity Tolerance: Patient tolerated treatment well     Plan    Physical Therapy Plan  General Plan: 5-7 times per week  Current Treatment Recommendations: Strengthening;ROM;Functional mobility training;Balance training;Transfer training;Gait training;Stair training;Endurance training;Home exercise program;Safety education & training;Patient/Caregiver education & training;Equipment evaluation, education, & procurement;Therapeutic activities     Restrictions  Restrictions/Precautions  Restrictions/Precautions: Fall Risk, General Precautions  Required Braces or Orthoses?: No  Implants present? :  (pt denies)  Position Activity Restriction  Other position/activity restrictions: up with assistance     Subjective    Pt states she did not get any rest last night, very tired this morning.  Pain: denies pain    Cognition  Arousal/Alertness: Appropriate responses to stimuli  Following Commands: Follows one step commands with repetition;Follows one step commands with increased time  Attention Span: Difficulty dividing attention  Safety Judgement: Decreased awareness of need for assistance;Decreased awareness of need for safety  Problem Solving: Assistance required to identify errors made;Assistance required to generate solutions  Insights: Decreased awareness of deficits  Initiation: Does not require cues  Sequencing: Does not require cues  Cognition Comment: Pt also very [place].  per psychiatry note 1-24: MOCA performed scoring a 19/30 (mild cognitive impairment).  pt with fair memory, problem solving for basic self care, lacks safety judgement, problem solving 
Physical Therapy  Facility/Department: [facility] PROGRESSIVE CARE   Physical Therapy     NAME: Scarlett Ferguson  : 11/3/1931 (89 y.o.)  MRN: [id number]  CODE STATUS: DNR-CC    Date of Service: 24      History reviewed. No pertinent past medical history.  History reviewed. No pertinent surgical history.    Chart Reviewed: Yes  Patient assessed for rehabilitation services?: Yes  Family / Caregiver Present: No  General Comment  Comments: pt very [place]    Restrictions:  Restrictions/Precautions: Fall Risk;General Precautions  Position Activity Restriction  Other position/activity restrictions: up with assistance     SUBJECTIVE  Pain: pt denies pain at rest. Pain present in (B) knees with amb R>L. Reports IBuprofen  helps.      OBJECTIVE  Vision  Vision: Within Functional Limits (redness/swelling in L eye)    Hearing  Hearing: Exceptions to WFL  Hearing Exceptions: Hard of hearing/hearing concerns;No hearing aid      Functional Mobility  Bed mobility  Rolling to Left: Supervision  Rolling to Right: Supervision  Supine to Sit: Stand by assistance  Sit to Supine: Stand by assistance  Scooting: Stand by assistance  Bed Mobility Comments: completed on flat bed  Transfers  Sit to Stand: Contact guard assistance  Stand to Sit: Contact guard assistance  Stand Pivot Transfers: Minimal Assistance (w/RW)  Squat Pivot Transfers: Contact guard assistance  Comment: Performed STS from EOB and transferred to chair at bedside on opposite side of room with Min A x1 due to pt having flexion in luis felipe knees, excessive flexion at hips and gaze downwards, used WW. Increased time needed to complete  Balance  Posture: Poor  Sitting - Static: Fair;+  Sitting - Dynamic: Fair;+  Standing - Static: Fair (with RW)  Standing - Dynamic: Fair;- (with RW)    Environmental Mobility  Ambulation  Surface: Level tile  Device: Rolling Walker  Assistance: Minimal assistance;Contact guard assistance  Quality of Gait: excecssive flexion at hips, Luis Felipe knee flexion, gaze 
Physical Therapy  Facility/Department: [facility] MED SURG  Daily Treatment Note  NAME: Scarlett Ferguson  : 11/3/1931  MRN: [id number]    Date of Service: 2024    Discharge Recommendations:  Therapy recommended at discharge        Patient Diagnosis(es): The primary encounter diagnosis was Hypokalemia. A diagnosis of Hypertensive urgency was also pertinent to this visit.       Activity Tolerance: Patient tolerated treatment well     Plan    Physical Therapy Plan  General Plan: 5-7 times per week  Current Treatment Recommendations: Strengthening;ROM;Functional mobility training;Balance training;Transfer training;Gait training;Stair training;Endurance training;Home exercise program;Safety education & training;Patient/Caregiver education & training;Equipment evaluation, education, & procurement;Therapeutic activities     Restrictions  Restrictions/Precautions  Restrictions/Precautions: Fall Risk, General Precautions  Required Braces or Orthoses?: No  Implants present? :  (pt denies)  Position Activity Restriction  Other position/activity restrictions: up with assistance     Subjective    Pt lying in bed upon entering room  Pain: No pain noted by patient.    Cognition  Overall Cognitive Status: Exceptions  Arousal/Alertness: Appropriate responses to stimuli  Following Commands: Follows one step commands with repetition;Follows one step commands with increased time  Attention Span: Attends with cues to redirect  Memory: Appears intact  Safety Judgement: Decreased awareness of need for assistance;Decreased awareness of need for safety  Problem Solving: Assistance required to correct errors made;Decreased awareness of errors;Assistance required to identify errors made;Assistance required to generate solutions;Assistance required to implement solutions  Insights: Decreased awareness of deficits  Initiation: Requires cues for some  Sequencing: Does not require cues  Cognition Comment: Pt also very [place], requiring consistent cues for 
Physical Therapy  Facility/Department: [facility] MED SURG  Daily Treatment Note  NAME: Scarlett Ferguson  : 11/3/1931  MRN: [id number]    Date of Service: 2024    Discharge Recommendations:  Therapy recommended at discharge        Patient Diagnosis(es): The primary encounter diagnosis was Hypokalemia. A diagnosis of Hypertensive urgency was also pertinent to this visit.    Activity Tolerance: Patient tolerated treatment well     Plan    Physical Therapy Plan  General Plan: 5-7 times per week  Current Treatment Recommendations: Strengthening;ROM;Functional mobility training;Balance training;Transfer training;Gait training;Stair training;Endurance training;Home exercise program;Safety education & training;Patient/Caregiver education & training;Equipment evaluation, education, & procurement;Therapeutic activities     Restrictions  Restrictions/Precautions  Restrictions/Precautions: Fall Risk, General Precautions  Required Braces or Orthoses?: No  Implants present? :  (pt denies)  Position Activity Restriction  Other position/activity restrictions: up with assistance     Subjective    Pt resting in bed upon entering room  Pain: denies pain    Cognition  Arousal/Alertness: Appropriate responses to stimuli  Following Commands: Follows one step commands with repetition;Follows one step commands with increased time  Attention Span: Attends with cues to redirect  Memory: Appears intact  Safety Judgement: Decreased awareness of need for assistance;Decreased awareness of need for safety  Problem Solving: Assistance required to identify errors made;Assistance required to generate solutions  Insights: Decreased awareness of deficits  Initiation: Requires cues for some  Sequencing: Does not require cues  Cognition Comment: Pt also very [place], requiring consistent cues for safety     Objective      Bed Mobility Training  Rolling: Supervision (To Left)  Supine to Sit: Supervision  Sit to Supine: Supervision    Balance  Sitting: 
Physical Therapy  Facility/Department: [facility] MED SURG   Physical Therapy     NAME: Scarlett Ferguson  : 11/3/1931 (89 y.o.)  MRN: [id number]  CODE STATUS: DNR-CC    Date of Service: 24      History reviewed. No pertinent past medical history.  History reviewed. No pertinent surgical history.    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 No complaints at rest.       Functional Mobility  Bed mobility  Rolling to Left: Supervision  Rolling to Right: Supervision  Supine to Sit: Supervision  Sit to Supine: Supervision  Scooting: Supervision  Bed Mobility Comments: pt demonstrated swinging LEs and rolling across bed to looking for shoes under bed prior to getting up.  Transfers  Sit to Stand: Stand by assistance (cues for hand placement/safety)  Stand to Sit: Stand by assistance (cues for hand placement/safety)  Stand Pivot Transfers: Contact guard assistance (w/RW)  Comment: STS performed from EOB x 3 and bedside chair x 1    Environmental Mobility  Ambulation  Surface: Level tile  Device: Rolling Walker  Assistance: Contact guard assistance  Quality of Gait: excecssive flexion at hips, [place] knee flexion, gaze downward, WW far in front of pt due to her hip flexion. scuffs RLE  Gait Deviations: Slow Leighann;Decreased step length;Decreased step height;Decreased head and trunk rotation  Distance: 53ft  Comments: Cues for safety to keep herself up inside LOUIE of RW with fair but fleeting return  Stairs/Curb  Stairs?: Yes  Stairs  # Steps : 2  Stairs Height: 2\"  Device: Rolling walker  Assistance: Contact guard assistance  Comment: Pt able to ascend/decend 2in step using RW CGA for safety. Reports she has \"things to hold onto when entering home\" When asked if she can use RW as support when entering home pt replied \" oh yeah\"    PT Exercises  A/AROM Exercises: (B) LE seated ex x 
Physical Therapy  [facility]    Date: 24  Patient Name: Scarlett Ferguson       Room: 82108-01  MRN: [id number]   Account: [id number]   : 11/3/1931  (89 y.o.) Gender: female     Referring Practitioner: Cherri Mattson MD  Diagnosis: Hypertensive urgency  Past Medical History:  has no past medical history on file.   Past Surgical History:   has no past surgical history on file.       Overall Orientation Status: Within Functional Limits  Restrictions/Precautions  Restrictions/Precautions: Fall Risk;General Precautions  Required Braces or Orthoses?: No  Implants present? :  (pt denies)  Position Activity Restriction  Other position/activity restrictions: up with assistance    Subjective: Pt up in chair with OT upon arival, agreeable to therapy  Comments: WYATT Burton approved therapy; co-treat with WILSON Pretty       Pain Assessment: None - Denies Pain     Oxygen Therapy  O2 Device: None (Room air)    Transfers:  Sit to Stand: Minimal Assistance;2 Person Assistance  Stand to Sit: Minimal Assistance;2 Person Assistance      Ambulation  Surface: Level tile  Device: Rolling Walker  Assistance: Minimal assistance;2 Person assistance  Quality of Gait: excecssive flexion at hips, Luis Felipe knee flexion, gaze downward, WW far in front of pt due to her hip flexion  Gait Deviations: Slow Leighann;Decreased step length;Decreased step height;Decreased head and trunk rotation  Distance: ~15ft  Comments: pt requires 2 person assist for safety due to posture and weakness and balance deficits. Requires increased time to ambulate short distances        Stairs/Curb  Stairs?: No    EXERCISES    Other exercises?: Yes  Other exercises 1: bed mobility x1  Other exercises 2: stand pivot from bed to chair  Other exercises 3: seated B LE exercises x20 reps  Other exercises 4: seated B LE exercises x20 reps with orange tband  Other exercises 5: seated B UE exercises facilitated by RACHEL           Activity Tolerance: Patient 
[facility] called to give report. Cher RN given report, all questions and concerns addressed. Pt belongings packed and sent with transport. Transport team given report at bedside.   
Psych notified of consult.  
Pt implored writer to contact [facility] as she felt they would have \"moral help\" for her. Pt also spoke about a young man who she believes is the reason she has no electricity or heat and comes in when she doesn't see him to \"do things\" in the house. Pt also spoke about her cats needing her. After we had prayer, pt became tearful. RN packed up her belongings as pt being moved to [facility].    01/22/24 1831   Encounter Summary   Encounter Overview/Reason  Spiritual/Emotional Needs   Service Provided For: Patient   Referral/Consult From: Palliative Care   Last Encounter  01/22/24   Complexity of Encounter Moderate   Spiritual/Emotional needs   Type Emotional Distress   Palliative Care   Type Palliative Care, Follow-up   Assessment/Intervention/Outcome   Assessment Anxious;Fearful;Powerlessness;Tearful   Intervention Active listening;Explored/Affirmed feelings, thoughts, concerns;Prayer (assurance of)/[place];Sustaining Presence/Ministry of presence   Outcome Comfort;Engaged in conversation;Expressed feelings, needs, and concerns;Expressed Gratitude;Venting emotion       
Pt needed 2 assist to pivot to the commode. Informed Mary Lou,  of the unsafe discharge. Saba spoke to pt, discharge was put on hold. Transport had arrived and was informed to cancel transport at this time.   Pt stated \"I can walk, I'll show you.\" It took much effort for pt to stand on her own, used walker to move 4 feet with a staggering gait. Pt sat down after much effort. Social work determined then to cancel transport.   Pt informed staff that she takes ibuprofen at home for her knees. Also insisted the cats will die if I am not home.  Pt threatening to go AMA, I will crawl to a cab to get home. Pt upset about not going home.   Dr. Landa also informed pt is not going home and ordered Lopressor to start while pt is in the hospital.  
Pt was angry that she wasn't going home as she is very concerned about her cats and insists she is able to care for self. Writer provided listening presence and pt calmed down a bit as we talked and writer prayed.    01/20/24 1433   Encounter Summary   Encounter Overview/Reason  Spiritual/Emotional Needs   Service Provided For: Patient   Referral/Consult From: Palliative Care   Last Encounter  01/20/24   Complexity of Encounter High   Spiritual/Emotional needs   Type Emotional Distress   Palliative Care   Type Palliative Care, Follow-up   Assessment/Intervention/Outcome   Assessment Angry;Stress overload   Intervention Active listening;Discussed illness injury and it’s impact;Prayer (assurance of)/[place];Sustaining Presence/Ministry of presence   Outcome Deescalated;Engaged in conversation;Expressed feelings, needs, and concerns;Venting emotion       
Pt was glad to talk and explained to writer about having the court identify a guardian for her and that she hoped he would find her somewhere safe to go...\"so much red tape\" she said. Pt talked about neighbor visiting and taking care of the cats. She also reiterated how \"unsafe\" her neighborhood had become and her fear for her neighbors. \"I'll be safe but what about them?\" Writer provided listening presence and a blessing.    02/01/24 5835   Encounter Summary   Encounter Overview/Reason  Spiritual/Emotional Needs   Service Provided For: Patient   Referral/Consult From: Palliative Care   Last Encounter  02/01/24   Complexity of Encounter Moderate   Spiritual/Emotional needs   Type Spiritual Support   Palliative Care   Type Palliative Care, Follow-up   Assessment/Intervention/Outcome   Assessment Calm   Intervention Active listening;Discussed illness injury and it’s impact;Explored/Affirmed feelings, thoughts, concerns;Prayer (assurance of)/[place];Sustaining Presence/Ministry of presence   Outcome Coping;Engaged in conversation;Expressed feelings, needs, and concerns;Expressed Gratitude;Receptive       
[facility]  IN-PATIENT SERVICE  [facility]    PROGRESS NOTE             Date:   1/20/2024  Patient name:  Scarlett Ferguson  Date of admission:  1/17/2024  7:52 PM  MRN:   [id number]  YOB: 1931    INTERVAL HISTORY:      SUBJECTIVE:  Frail lady laying in bed, currently denies concerns.  Denies chest pain, no palpitations.    No other concerns.    Objective   Vitals:    01/19/24 1952 01/19/24 2355 01/20/24 0345 01/20/24 0845   BP: (!) 152/74 (!) 153/81 (!) 133/58 (!) 151/68   Pulse: 86 100 81 87   Resp: 18 16 16 17   Temp: 98.4 °F (36.9 °C) 97.9 °F (36.6 °C) 98.1 °F (36.7 °C) 98.3 °F (36.8 °C)   TempSrc: Oral Oral Oral Oral   SpO2: 97% 94% 91% 94%   Weight:       Height:         BP Readings from Last 6 Encounters:   01/20/24 (!) 151/68        No intake or output data in the 24 hours ending 01/20/24 0852  General appearance: Poorly nourished  HEENT: Normocephalic, no lesions, without obvious abnormality.pupils equal and reactive, EOM normal  Lungs: clear to auscultation bilaterally  Heart: regular rate and rhythm, S1, S2 normal, no murmur, click, rub or gallop  Abdomen: soft, non-tender; bowel sounds normal; no masses,  no organomegaly  Extremities: extremities normal, atraumatic, no cyanosis or edema. Pulses 2+ and symmetrical in all 4 extremities  Neurological: Alert oriented x 3, moves all 4 extremities spontaneously.  Makes adequate conversation    CBC:   Recent Labs     01/17/24 2036 01/18/24  0538 01/19/24  0506 01/20/24  0538   WBC 7.2 5.4 4.6 5.9   HGB 12.4 10.2* 9.6* 9.8*    283 235 233       BMP:    Recent Labs     01/17/24 2036 01/18/24  0538 01/19/24  0506 01/20/24  0538    144 143 143   K 3.3* 3.7 3.7 3.8    109* 110* 109*   CO2 28 26 25 24   BUN 21 24* 26* 24*   CREATININE 0.6 0.5 0.6 0.5   CALCIUM 9.3 8.4* 8.3* 8.5*       Magnesium:  Recent Labs     01/19/24  0506 01/20/24  0538   MG 2.0 2.1       Recent Labs     01/19/24  0506 
[facility]  IN-PATIENT SERVICE  [facility]    PROGRESS NOTE             Date:   1/31/2024  Patient name:  Scarlett Ferguson  Date of admission:  1/17/2024  7:52 PM  MRN:   [id number]  YOB: 1931    INTERVAL HISTORY:      SUBJECTIVE:  Frail lady laying in bed, currently denies concerns.  Denies chest pain, no palpitations.    No other concerns.    Objective   Vitals:    01/30/24 2330 01/30/24 2335 01/31/24 0619 01/31/24 0845   BP: (!) 107/34 (!) 121/44 (!) 114/44 (!) 103/41   Pulse: 53 59 (!) 43 59   Resp: 16  18    Temp: 98.1 °F (36.7 °C)  97.9 °F (36.6 °C)    TempSrc:       SpO2: 94%  94%    Weight:       Height:         BP Readings from Last 6 Encounters:   01/31/24 (!) 103/41    1/29   Patient clinically doing better  No complaints today  Blood pressure better controlled      No intake or output data in the 24 hours ending 01/31/24 1000    General appearance: Poorly nourishedleft eye eversion  HEENT: Normocephalic, no lesions, without obvious abnormality.pupils equal and reactive, EOM normal  Lungs: clear to auscultation bilaterally  Heart: regular rate and rhythm, S1, S2 normal, no murmur, click, rub or gallop  Abdomen: soft, non-tender; bowel sounds normal; no masses,  no organomegaly  Extremities: extremities normal, atraumatic, no cyanosis or edema. Pulses 2+ and symmetrical in all 4 extremities  Neurological: Alert oriented x 3, moves all 4 extremities spontaneously.  Makes adequate conversation    CBC:   No results for input(s): \"WBC\", \"HGB\", \"PLT\" in the last 72 hours.    BMP:    No results for input(s): \"NA\", \"K\", \"CL\", \"CO2\", \"BUN\", \"CREATININE\", \"CALCIUM\" in the last 72 hours.  Magnesium:  No results for input(s): \"MG\" in the last 72 hours.    No results for input(s): \"AST\", \"ALT\", \"ALB\", \"TP\" in the last 72 hours.    Invalid input(s): \"ALP\", \"TBIL\", \"BILID\"    Phosphorus:No results for input(s): \"PHOS\" in the last 72 hours.  Glucose:No results for 
[facility]  IN-PATIENT SERVICE  [facility]    PROGRESS NOTE             Date:   2/6/2024  Patient name:  Scarlett Ferguson  Date of admission:  1/17/2024  7:52 PM  MRN:   [id number]  YOB: 1931    INTERVAL HISTORY:      SUBJECTIVE:  Frail lady laying in bed, currently denies concerns.  Denies chest pain, no palpitations.    No other concerns.    Objective   Vitals:    02/04/24 2056 02/05/24 0604 02/05/24 1837 02/06/24 0609   BP: 138/86 131/69 (!) 142/59 (!) 127/43   Pulse: 64 55 73 (!) 48   Resp:  18 16 18   Temp:  97.5 °F (36.4 °C) 98.4 °F (36.9 °C) 97.9 °F (36.6 °C)   TempSrc:   Oral Oral   SpO2:  94% 95% 94%   Weight:       Height:         BP Readings from Last 6 Encounters:   02/06/24 (!) 127/43    1/29   Patient clinically doing better  No complaints today  Blood pressure better controlled      No intake or output data in the 24 hours ending 02/06/24 1102      General appearance: Poorly nourishedleft eye eversion  HEENT: Normocephalic, no lesions, without obvious abnormality.pupils equal and reactive, EOM normal  Lungs: clear to auscultation bilaterally  Heart: regular rate and rhythm, S1, S2 normal, no murmur, click, rub or gallop  Abdomen: soft, non-tender; bowel sounds normal; no masses,  no organomegaly  Extremities: extremities normal, atraumatic, no cyanosis or edema. Pulses 2+ and symmetrical in all 4 extremities  Neurological: Alert oriented x 3, moves all 4 extremities spontaneously.  Makes adequate conversation  Severe deformities in both hands and knees from osteoarthritis  CBC:   No results for input(s): \"WBC\", \"HGB\", \"PLT\" in the last 72 hours.      BMP:    Recent Labs     02/05/24  0617      K 4.2      CO2 27   BUN 37*   CREATININE 0.5   CALCIUM 9.1     Magnesium:  No results for input(s): \"MG\" in the last 72 hours.    No results for input(s): \"AST\", \"ALT\", \"ALB\", \"TP\" in the last 72 hours.    Invalid input(s): \"ALP\", \"TBIL\", 
[facility]  IN-PATIENT SERVICE  [facility]    PROGRESS NOTE             Date:   2/2/2024  Patient name:  Scarlett Ferguson  Date of admission:  1/17/2024  7:52 PM  MRN:   [id number]  YOB: 1931    INTERVAL HISTORY:      SUBJECTIVE:  Frail lady laying in bed, currently denies concerns.  Denies chest pain, no palpitations.    No other concerns.    Objective   Vitals:    02/01/24 0930 02/01/24 1239 02/01/24 1800 02/02/24 0558   BP: (!) 129/52 (!) 129/59 (!) 116/51 (!) 140/51   Pulse: 61 61 70 98   Resp: 17 16 18 16   Temp: 98 °F (36.7 °C) 97.8 °F (36.6 °C) 98.4 °F (36.9 °C) 97.7 °F (36.5 °C)   TempSrc: Oral Oral  Oral   SpO2:   97% 92%   Weight:       Height:         BP Readings from Last 6 Encounters:   02/02/24 (!) 140/51    1/29   Patient clinically doing better  No complaints today  Blood pressure better controlled        Intake/Output Summary (Last 24 hours) at 2/2/2024 1043  Last data filed at 2/1/2024 1238  Gross per 24 hour   Intake 240 ml   Output --   Net 240 ml       General appearance: Poorly nourishedleft eye eversion  HEENT: Normocephalic, no lesions, without obvious abnormality.pupils equal and reactive, EOM normal  Lungs: clear to auscultation bilaterally  Heart: regular rate and rhythm, S1, S2 normal, no murmur, click, rub or gallop  Abdomen: soft, non-tender; bowel sounds normal; no masses,  no organomegaly  Extremities: extremities normal, atraumatic, no cyanosis or edema. Pulses 2+ and symmetrical in all 4 extremities  Neurological: Alert oriented x 3, moves all 4 extremities spontaneously.  Makes adequate conversation    CBC:   Recent Labs     01/31/24  1033   WBC 5.7   HGB 10.5*          BMP:    Recent Labs     01/31/24  1033 02/01/24  0547    141   K 4.4 5.0    104   CO2 29 28   BUN 43* 46*   CREATININE 0.7 0.8   CALCIUM 8.9 8.7     Magnesium:  No results for input(s): \"MG\" in the last 72 hours.    No results for input(s): 
[facility]   INPATIENT OCCUPATIONAL THERAPY  PROGRESS NOTE  Date: 2024  Patient Name: Scarlett Ferguson       Room: -  MRN: [id number]    : 11/3/1931  (89 y.o.)  Gender: female   Referring Practitioner: Cherri Mattson MD  Diagnosis: Hypertensive urgency      Discharge Recommendations:  Further Occupational Therapy is recommended upon facility discharge.    OT Equipment Recommendations  Other: TBD    Restrictions/Precautions  Restrictions/Precautions  Restrictions/Precautions: Fall Risk;General Precautions  Required Braces or Orthoses?: No  Implants present? :  (pt denies)  Position Activity Restriction  Other position/activity restrictions: up with assistance    O2 Device: None (Room air)    Subjective  Subjective  Subjective: \"Just let me know about my cats\" Pt is concerned about the well-being of her cats, writer providing emotional support. Multiple attempts to re-direct attention to task at hand with Fair carry over  Subjective  Pain: Pt endorsed R knee pain but did not formally rate, requested pain medications  Comments: Okay for OT PT session per RN    Objective  Cognition  Overall Cognitive Status: Exceptions  Arousal/Alertness: Appropriate responses to stimuli  Following Commands: Follows one step commands with repetition;Follows one step commands with increased time  Attention Span: Attends with cues to redirect  Memory: Appears intact  Safety Judgement: Decreased awareness of need for assistance;Decreased awareness of need for safety  Problem Solving: Assistance required to correct errors made;Decreased awareness of errors;Assistance required to identify errors made;Assistance required to generate solutions;Assistance required to implement solutions  Insights: Decreased awareness of deficits  Initiation: Requires cues for some  Sequencing: Does not require cues  Cognition Comment: Pt also very [place], requiring consistent cues for safety    Activities of Daily 
72 hours.  Hemoglobin a1cNo results found for: \"LABA1C\"    IMAGING  Reviewed    MICRO   [unfilled]       SCHEDULED MEDICATIONS PRN MEDICATIONS   [START ON 1/23/2024] lisinopril, 40 mg, Daily  enoxaparin, 40 mg, Daily  metoprolol tartrate, 12.5 mg, BID  sodium chloride flush, 5-40 mL, 2 times per day     ibuprofen, 400 mg, Q6H PRN  sodium chloride flush, 10 mL, PRN  melatonin, 3 mg, Nightly PRN  sodium chloride flush, 5-40 mL, PRN  sodium chloride, , PRN  potassium chloride, 40 mEq, PRN   Or  potassium alternative oral replacement, 40 mEq, PRN   Or  potassium chloride, 10 mEq, PRN  magnesium sulfate, 2,000 mg, PRN  ondansetron, 4 mg, Q8H PRN   Or  ondansetron, 4 mg, Q6H PRN  polyethylene glycol, 17 g, Daily PRN  acetaminophen, 650 mg, Q6H PRN   Or  acetaminophen, 650 mg, Q6H PRN  perflutren lipid microspheres, 1.5 mL, ONCE PRN  hydrALAZINE, 10 mg, Q6H PRN           ASSESSMENT & PLAN:  Hypertensive urgency, pressure still elevated.  Will increase dose of lisinopril to 40 mg p.o. daily  Bradycardia, sinus versus AF with slow response.  Cardiology on board, page ts within normal limits, cardiology on board.  Feel okay with metoprolol.    SVT resolved  Social issues, agreeable to going to an assisted living.  Patient unable to walk, however keeps refusing placement.  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  Concerns of volume overload noted on CT imaging, not noted on physical exam.  Monitor for now    DVT PROPHYLAXIS: Lovenox  GI PROPHYLAXIS: None indicated  CODE STATUS: DNR-CC   DISPOSITION:    Kary [place] [address].   Phone [phone]   Fax: [phone]  Answering Service: [phone]   
Training  Bed Mobility Training: Yes (HOB elevated, no use of bed rails)  Overall Level of Assistance: Modified independent  Rolling: Modified independent  Supine to Sit: Modified independent  Sit to Supine: Modified independent  Scooting: Modified independent (to HOB, to EOB, and along EOB)  Balance  Sitting: Intact  Standing: With support  Transfer Training  Transfer Training: Yes (with RW)  Overall Level of Assistance: Contact-guard assistance  Sit to Stand: Contact-guard assistance  Stand to Sit: Contact-guard assistance  Stand Pivot Transfers: Contact-guard assistance  Gait Training  Gait Training: Yes  Gait  Gait Training: Yes  Overall Level of Assistance: Contact-guard assistance  Distance (ft): 73 Feet  Assistive Device: Walker, rolling;Gait belt  Interventions: Verbal cues;Safety awareness training;Tactile cues  Base of Support: Widened  Speed/Leighann: Shuffled;Slow  Step Length: Left shortened;Right shortened  Gait Abnormalities: Decreased step clearance;Shuffling gait;Step to gait (Genu varum at knees, severe fwd flexed posture, leans heavy using forearms on RW)     PT Exercises  Dynamic Sitting Balance Exercises: Sat EOB x 10 min while performing (B) LE seated ex             Goals  Short Term Goals  Time Frame for Short Term Goals: 7 visits  Short Term Goal 1: pt to perform bed mobility with Supervision  Short Term Goal 2: pt to transfer bed to chair with WW and Min A x1  Short Term Goal 3: pt to ambulate 40ft c WW and Min A x1  Short Term Goal 4: pt to perform seated HEP indep  Short Term Goal 5: pt to ambualte 1 stair with Min A  Patient Goals   Patient Goals : to go home so she can finish cleaning her house and get her cats placed in homes    Education  Patient Education  Education Given To: Patient  Education Provided: Role of Therapy;Plan of Care;Home Exercise Program;Precautions;Fall Prevention Strategies;Transfer Training  Education Method: Verbal;Demonstration  Barriers to Learning: 
doctor's asssessment that pt lacks cognition for independently managing her own advanced adls, safety and medical management without assist.     Objective   Bed Mobility Training  Bed Mobility Training: Yes (HOB Elevated , no use of bed rails .)  Overall Level of Assistance: Modified independent  Rolling: Modified independent  Supine to Sit: Modified independent  Sit to Supine: Modified independent  Scooting: Modified independent (to HOB)  Balance  Sitting: Intact  Standing: With support  Transfer Training  Transfer Training: Yes (with RW)  Overall Level of Assistance: Contact-guard assistance  Sit to Stand: Contact-guard assistance  Stand to Sit: Contact-guard assistance  Stand Pivot Transfers: Contact-guard assistance  Gait Training  Gait Training: Yes  Gait  Gait Training: Yes  Overall Level of Assistance: Contact-guard assistance  Distance (ft): 66 Feet  Assistive Device: Walker, rolling;Gait belt  Interventions: Verbal cues;Safety awareness training;Tactile cues  Base of Support: Widened  Gait Abnormalities:  (B leg bow out at the knees , serverly forward flexed trunk .)     PT Exercises  Exercise Treatment: AB crunches x10 in supine  A/AROM Exercises: Supine B LE and B UE ex's x20     Safety Devices  Type of Devices: Left in bed;Bed alarm in place;Call light within reach;Nurse notified  Restraints  Restraints Initially in Place: No       Goals  Short Term Goals  Time Frame for Short Term Goals: 7 visits  Short Term Goal 1: pt to perform bed mobility with Supervision  Short Term Goal 2: pt to transfer bed to chair with WW and Min A x1  Short Term Goal 3: pt to ambulate 40ft c WW and Min A x1  Short Term Goal 4: pt to perform seated HEP indep  Short Term Goal 5: pt to ambualte 1 stair with Min A  Patient Goals   Patient Goals : to go home so she can finish cleaning her house and get her cats placed in homes    Education  Patient Education  Education Given To: Patient  Education Provided Comments: Importance of 
results for input(s): \"AST\", \"ALT\", \"ALB\", \"TP\" in the last 72 hours.    Invalid input(s): \"ALP\", \"TBIL\", \"BILID\"    Phosphorus:No results for input(s): \"PHOS\" in the last 72 hours.  Glucose:No results for input(s): \"POCGLU\" in the last 72 hours.  Hemoglobin a1cNo results found for: \"LABA1C\"    IMAGING  Reviewed    MICRO   [unfilled]       SCHEDULED MEDICATIONS PRN MEDICATIONS   metoprolol tartrate, 12.5 mg, BID  vitamin B-12, 100 mcg, Daily  rivastigmine, 1 patch, Daily  [Held by provider] lisinopril, 40 mg, Daily  enoxaparin, 40 mg, Daily  sodium chloride flush, 5-40 mL, 2 times per day     ibuprofen, 400 mg, Q6H PRN  sodium chloride flush, 10 mL, PRN  melatonin, 3 mg, Nightly PRN  sodium chloride flush, 5-40 mL, PRN  sodium chloride, , PRN  potassium chloride, 40 mEq, PRN   Or  potassium alternative oral replacement, 40 mEq, PRN   Or  potassium chloride, 10 mEq, PRN  magnesium sulfate, 2,000 mg, PRN  ondansetron, 4 mg, Q8H PRN   Or  ondansetron, 4 mg, Q6H PRN  polyethylene glycol, 17 g, Daily PRN  acetaminophen, 650 mg, Q6H PRN   Or  acetaminophen, 650 mg, Q6H PRN  perflutren lipid microspheres, 1.5 mL, ONCE PRN  hydrALAZINE, 10 mg, Q6H PRN           ASSESSMENT & PLAN:  Hypertensive urgency, pressure still elevated.  Will increase dose of lisinopril to 40 mg p.o. daily  Bradycardia, sinus versus AF with slow response.  Cardiology on board, page ts within normal limits, cardiology on board.  Feel okay with metoprolol.    SVT resolved  Social issues, agreeable to going to an assisted living.  Patient unable to walk, however keeps refusing placement.  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  Concerns of volume overload noted on CT imaging, not noted on physical exam.  Monitor for now      DVT PROPHYLAXIS: Lovenox  GI PROPHYLAXIS: None indicated  CODE STATUS: DNR-CC     Hypertension improved log noted systolic 1 24-1 40 diastolic is 
the last 72 hours.  Hemoglobin a1cNo results found for: \"LABA1C\"    IMAGING  Reviewed    MICRO   [unfilled]       SCHEDULED MEDICATIONS PRN MEDICATIONS   lisinopril, 20 mg, Daily  enoxaparin, 40 mg, Daily  metoprolol tartrate, 12.5 mg, BID  sodium chloride flush, 5-40 mL, 2 times per day     melatonin, 3 mg, Nightly PRN  sodium chloride flush, 5-40 mL, PRN  sodium chloride, , PRN  potassium chloride, 40 mEq, PRN   Or  potassium alternative oral replacement, 40 mEq, PRN   Or  potassium chloride, 10 mEq, PRN  magnesium sulfate, 2,000 mg, PRN  ondansetron, 4 mg, Q8H PRN   Or  ondansetron, 4 mg, Q6H PRN  polyethylene glycol, 17 g, Daily PRN  acetaminophen, 650 mg, Q6H PRN   Or  acetaminophen, 650 mg, Q6H PRN  perflutren lipid microspheres, 1.5 mL, ONCE PRN  hydrALAZINE, 10 mg, Q6H PRN           ASSESSMENT & PLAN:  Hypertensive urgency, now bp better continue current meds  Bradycardia, sinus versus AF with slow response.  Cardiology on board, await recommendations.  TSH within normal limits, cardiology on board.  Feel okay with metoprolol.  Resumed  SVT  Social issues, agreeable to going to an assisted living  Lung nodule, obtain ct as follow up reliabilty doubted    DVT PROPHYLAXIS: Lovenox  GI PROPHYLAXIS: None indicated  CODE STATUS: DNR-CC   DISPOSITION:    Kary Don MD, [place] [address].   Phone [phone]   Fax: [phone]  Answering Service: [phone]   
Mobility    AM-PAC Basic Mobility - Inpatient   How much help is needed turning from your back to your side while in a flat bed without using bedrails?: None  How much help is needed moving from lying on your back to sitting on the side of a flat bed without using bedrails?: None  How much help is needed moving to and from a bed to a chair?: A Little  How much help is needed standing up from a chair using your arms?: A Little  How much help is needed walking in hospital room?: A Little  How much help is needed climbing 3-5 steps with a railing?: A Lot  AM-PAC Inpatient Mobility Raw Score : 19  AM-PAC Inpatient T-Scale Score : 45.44  Mobility Inpatient CMS 0-100% Score: 41.77  Mobility Inpatient CMS G-Code Modifier : CK          Goals  Short Term Goals  Time Frame for Short Term Goals: 7 visits  Short Term Goal 1: pt to perform bed mobility with Supervision  Short Term Goal 2: pt to transfer bed to chair with WW and Min A x1  Short Term Goal 3: pt to ambulate 40ft c WW and Min A x1  Short Term Goal 4: pt to perform seated HEP indep  Short Term Goal 5: pt to ambualte 1 stair with Min A  Patient Goals   Patient Goals : to go home so she can finish cleaning her house and get her cats placed in homes         Therapy Time   Individual Concurrent Group Co-treatment   Time In 1328         Time Out 1351         Minutes 23                 Guillermina Salas, PT       
Patient  Education Provided: Role of Therapy, Plan of Care, ADL Adaptive Strategies, Transfer Training, IADL Safety  Education Method: Verbal, Demonstration  Barriers to Learning: Cognition, Hearing  Education Outcome: Verbalized understanding, Continued education needed    Goals  Short Term Goals  Time Frame for Short Term Goals: By discharge, pt will  Short Term Goal 1: perform functional transfers/mobility during self care tasks with SBA, least restrictive device, and Good safety  Short Term Goal 2: perform upper body ADLs with setup assistance  Short Term Goal 3: perform lower body ADLs with SBA and adaptive equipment as needed  Short Term Goal 4: tolerate standing for 6+ minutes during self care tasks with CGA and no loss of balance  Short Term Goal 5: actively participate in 15+ minutes of therapeutic exercise/functional activity to increase overall strength/endurance needed for ADLs/IADLs  Occupational Therapy Plan  Times Per Week: 4-6  Current Treatment Recommendations: Strengthening, Balance training, Functional mobility training, Endurance training, Pain management, Safety education & training, Patient/Caregiver education & training, Equipment evaluation, education, & procurement, Self-Care / ADL, Home management training, Coordination training    Assessment  Activity Tolerance  Activity Tolerance: Patient Tolerated treatment well  Assessment  Performance deficits / Impairments: Decreased functional mobility , Decreased ADL status, Decreased strength, Decreased endurance, Decreased sensation, Decreased balance, Decreased safe awareness, Decreased cognition, Decreased high-level IADLs, Decreased coordination, Decreased posture, Decreased fine motor control  Treatment Diagnosis: impaired self care tasks  Prognosis: Good  Decision Making: Medium Complexity  Discharge Recommendations: Patient would benefit from continued therapy after discharge  OT Equipment Recommendations  Other: TBD  Safety Devices  Type of 
Role of Therapy;Plan of Care;Home Exercise Program;Precautions;Fall Prevention Strategies;Transfer Training  Education Method: Verbal;Demonstration  Barriers to Learning: Hearing  Education Outcome: Continued education needed;Demonstrated understanding    AM-PAC - Mobility    AM-PAC Basic Mobility - Inpatient   How much help is needed turning from your back to your side while in a flat bed without using bedrails?: A Little  How much help is needed moving from lying on your back to sitting on the side of a flat bed without using bedrails?: A Little  How much help is needed moving to and from a bed to a chair?: A Little  How much help is needed standing up from a chair using your arms?: A Little  How much help is needed walking in hospital room?: A Little  How much help is needed climbing 3-5 steps with a railing?: A Little  AM-PAC Inpatient Mobility Raw Score : 18  AM-PAC Inpatient T-Scale Score : 43.63  Mobility Inpatient CMS 0-100% Score: 46.58  Mobility Inpatient CMS G-Code Modifier : CK         Therapy Time   Individual Concurrent Group Co-treatment   Time In 0954         Time Out 1017         Minutes 23                 Nelsy Yates, PTA           
alert  She has impaired hearing but follow verbal command  Denies any chest pain or palpitation no dizziness  Blood pressure 135/57, heart rate 60, oxygen saturation 92%, temperature 97 point  Neck veins were normal no sign of pleural effusion no peripheral edema      Medications:   Scheduled Meds:   vitamin B-12  50 mcg Oral Daily    hydroCHLOROthiazide  12.5 mg Oral Daily    lisinopril  40 mg Oral Daily    enoxaparin  40 mg SubCUTAneous Daily    metoprolol tartrate  12.5 mg Oral BID    sodium chloride flush  5-40 mL IntraVENous 2 times per day     Continuous Infusions:   sodium chloride       CBC: No results for input(s): \"WBC\", \"HGB\", \"PLT\" in the last 72 hours.  BMP:    Recent Labs     01/26/24  0646      K 4.2      CO2 29   BUN 32*   CREATININE 0.8   GLUCOSE 94     Hepatic: No results for input(s): \"AST\", \"ALT\", \"ALB\", \"BILITOT\", \"ALKPHOS\" in the last 72 hours.  Troponin: No results for input(s): \"TROPONINI\" in the last 72 hours.  BNP: No results for input(s): \"BNP\" in the last 72 hours.  Lipids: No results for input(s): \"CHOL\", \"HDL\" in the last 72 hours.    Invalid input(s): \"LDLCALCU\"  INR: No results for input(s): \"INR\" in the last 72 hours.    Objective:   Vitals: BP (!) 135/57   Pulse 77   Temp 97.9 °F (36.6 °C)   Resp 18   Ht 1.524 m (5')   Wt 60.3 kg (132 lb 15 oz)   SpO2 92%   BMI 25.96 kg/m²   General appearance: Frail looking elderly female resting on bed comfortably  HEENT: Head: Normal, normocephalic, atraumatic.,  Eversion lower eyelid, conjunctivitis left eye  Neck: no JVD and supple, symmetrical, trachea midline  Lungs:  Poor chest expansion diminished breath sounds no sign of pleural effusion  Heart:  Cardiac apical impulse not palpable heart sounds distant  Abdomen:  Soft bowel sounds present  Extremities:  Deformed lower extremities no edema  Neurologic: Mental status: Impaired hearing but follow verbal,    Assessment / Acute Cardiac Problems:     Episodes of sinus 
appear in any distress  She was checking her mail  Still waiting for placement  No dizziness able to ambulate  Pulse was regular heart rate about 60 blood pressure 120/45  No sign of cardiac decompensation      Sodium 140, potassium 4.2, creatinine 0.5, BUN 37, glucose 102, calcium 9.1       Medications:   Scheduled Meds:   metoprolol tartrate  12.5 mg Oral BID    vitamin B-12  100 mcg Oral Daily    rivastigmine  1 patch TransDERmal Daily    [Held by provider] lisinopril  40 mg Oral Daily    enoxaparin  40 mg SubCUTAneous Daily    sodium chloride flush  5-40 mL IntraVENous 2 times per day     Continuous Infusions:   sodium chloride       CBC: No results for input(s): \"WBC\", \"HGB\", \"PLT\" in the last 72 hours.  BMP:    Recent Labs     02/05/24  0617      K 4.2      CO2 27   BUN 37*   CREATININE 0.5   GLUCOSE 102*     Hepatic: No results for input(s): \"AST\", \"ALT\", \"ALB\", \"BILITOT\", \"ALKPHOS\" in the last 72 hours.  Troponin: No results for input(s): \"TROPONINI\" in the last 72 hours.  BNP: No results for input(s): \"BNP\" in the last 72 hours.  Lipids: No results for input(s): \"CHOL\", \"HDL\" in the last 72 hours.    Invalid input(s): \"LDLCALCU\"  INR: No results for input(s): \"INR\" in the last 72 hours.    Objective:   Vitals: BP (!) 127/43   Pulse (!) 48   Temp 97.9 °F (36.6 °C) (Oral)   Resp 18   Ht 1.524 m (5')   Wt 60.3 kg (132 lb 15 oz)   SpO2 94%   BMI 25.96 kg/m²   General appearance: Very frail looking female  HEENT: Normocephalic left eye conjunctivitis, eversion of left lower lid  Neck: no JVD and supple, symmetrical, trachea midline  Lungs: diminished breath sounds bibasilar  Heart: regular rate and rhythm  Abdomen:  Soft bowel sounds present  Extremities: Homans sign is negative, no sign of DVT  Neurologic: Mental status: Awake alert communicating well      Assessment / Acute Cardiac Problems:     Episodes of sinus bradycardia and PSVT/tachybradycardia syndrome improved  No significant problem 
discharge to facility, wants to go home, psych was consulted to assess capacity.assessment noted  Pt unable to make informed decision  Patient exhibits paranoia reports somebody stealing from her and wants to get her out of the house so they can take over her house.  APS involved  Dispo and discharge planning ecf    2/5  Patient, blood pressure well-controlled  Dr. Aguilar did peer to peer yesterday, refused for inpatient skilled facility  Discharge planner, working for expedited appeal  Overall prognosis guarded        Everett Sun [place] [address].   Phone [phone]   Fax: [phone]  Answering Service: [phone]   
exhibits paranoia reports somebody stealing from her and wants to get her out of the house so they can take over her house.  APS involved  Dispo and discharge planning depending on patient assessment of capacity.      1/24  No new issues since yesterday  Appreciate psychiatry recommendations-patient does not have capacity.  Guardianship paperwork filled today  Awaiting placement.    1/28  Seen examined face-to-face,  No new issues overnight  Patient appears comfortable  Eating drinking   lung sounds clear  Awaiting placement  Vitamin B12 is low-IV B12, continue with p.o. at discharge.  Extensively distorted and big toenails puncturing into the skin, podiatry on board  Guardianship awaited,  Possible discharge after that      Cherri [place]son [place] [address].   Phone [phone]   Fax: [phone]  Answering Service: [phone]   
for some     Objective   Pulse: 86  BP: 139/73  MAP (Calculated): 95  Respirations: 16  SpO2: 92 %  O2 Device: None (Room air)  Temp: 98.2 °F (36.8 °C)  Comment: denies dizziness or SOB     Observation/Palpation  Posture: Good  Gross Assessment  AROM: Grossly decreased, non-functional  PROM: Grossly decreased, non-functional  Strength: Grossly decreased, non-functional  Coordination: Generally decreased, functional  Tone: Normal  Sensation: Intact     PROM RLE (degrees)  RLE PROM:  (lacks about 15 -20 degrees of terminal knee extension)  AROM RLE (degrees)  RLE AROM:  (lacks about 15 -20 degrees of terminal knee extension)  PROM LLE (degrees)  LLE PROM:  (lacks about 15 -20 degrees of terminal knee extension)  AROM LLE (degrees)  LLE AROM :  (lacks about 15 -20 degrees of terminal knee extension)  Strength RLE  Comment: hip and knee 4-/5, ankle 4/5  Strength LLE  Comment: hip and knee 4-/5, ankle 4/5  Tone RLE  RLE Tone: Normotonic  Tone LLE  LLE Tone: Normotonic  Coordination  Movements Are Fluid And Coordinated: Yes (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)  Motor Control  Gross Motor?: WFL  Sensation  Overall Sensation Status: WFL     Bed mobility  Supine to Sit: Stand by assistance (head of bed partially elevated without use of bed rail)  Sit to Supine: Unable to assess (pt retired in bedside chair)  Scooting: Stand by assistance  Transfers  Sit to Stand: Minimal Assistance  Stand to Sit: Minimal Assistance  Bed to Chair: Minimal assistance;2 Person Assistance  Stand Pivot Transfers: Minimal Assistance;2 Person Assistance  Comment: Performed STS from EOB and transferred to toilet with Min A x2 due to pt having flexion in luis felipe knees, excessive flexion at hips and gaze downwards, used WW. Increased time needed to complete  Ambulation  Surface: Level tile  Device: Rolling Walker  Assistance: Minimal assistance;2 Person assistance  Quality of Gait: excecssive flexion at hips, Luis Felipe knee flexion, gaze downward, 
has impaired hearing but does communicate very well  She said she is feeling well she denied any chest pain or shortness of  No dizziness when she stands up  Eating and drinking well    Her pulse was about 60 bpm regular blood pressure 130/70, oxygen saturation 98% room air    Sodium 140, potassium 4.2, creatinine 0.5, BUN 37, glucose 102, calcium 9.1    Medications:   Scheduled Meds:   metoprolol tartrate  12.5 mg Oral BID    vitamin B-12  100 mcg Oral Daily    rivastigmine  1 patch TransDERmal Daily    [Held by provider] lisinopril  40 mg Oral Daily    enoxaparin  40 mg SubCUTAneous Daily    sodium chloride flush  5-40 mL IntraVENous 2 times per day     Continuous Infusions:   sodium chloride       CBC: No results for input(s): \"WBC\", \"HGB\", \"PLT\" in the last 72 hours.  BMP:    Recent Labs     02/05/24  0617      K 4.2      CO2 27   BUN 37*   CREATININE 0.5   GLUCOSE 102*     Hepatic: No results for input(s): \"AST\", \"ALT\", \"ALB\", \"BILITOT\", \"ALKPHOS\" in the last 72 hours.  Troponin: No results for input(s): \"TROPONINI\" in the last 72 hours.  BNP: No results for input(s): \"BNP\" in the last 72 hours.  Lipids: No results for input(s): \"CHOL\", \"HDL\" in the last 72 hours.    Invalid input(s): \"LDLCALCU\"  INR: No results for input(s): \"INR\" in the last 72 hours.    Objective:   Vitals: /69   Pulse 55   Temp 97.5 °F (36.4 °C)   Resp 18   Ht 1.524 m (5')   Wt 60.3 kg (132 lb 15 oz)   SpO2 94%   BMI 25.96 kg/m²   General appearance: Frail looking elderly female  HEENT: Head: Normal, normocephalic, atraumatic.  Neck: no JVD and supple, symmetrical, trachea midline  Lungs: diminished breath sounds bibasilar  Heart: regular rate and rhythm  Abdomen:  Soft bowel sounds present  Extremities: Homans sign is negative, no sign of DVT deformed lower extremities  Neurologic: Mental status: Impaired hearing follow verbal command    Assessment / Acute Cardiac Problems:     Episodes of sinus bradycardia and 
issues overnight  Note CODE STATUS DNR CC-patient appears comfortable  Patient is refusing discharge to facility, wants to go home, psych was consulted yesterday to assess capacity. Pending assessment   Patient exhibits paranoia reports somebody stealing from her and wants to get her out of the house so they can take over her house.  APS involved  Dispo and discharge planning depending on patient assessment of capacity.      1/24  No new issues since yesterday  Appreciate psychiatry recommendations-patient does not have capacity.  Guardianship paperwork filled today  Awaiting placement.    1/28  Seen examined face-to-face,  No new issues overnight  Patient appears comfortable  Eating drinking   lung sounds clear  Awaiting placement  Vitamin B12 is low-IV B12, continue with p.o. at discharge.  Extensively distorted and big toenails puncturing into the skin, podiatry on board  Guardianship awaited,  Possible discharge after that  1/30  Blood pressure, controlled  On DVT prophy Lovenox  Vitamin B12 replacement  Awaiting placement  Guardianship paperwork going on  Will need input from podiatry  DNRCC  Overall prognosis guarded  2/1  Patient, blood pressure better controlled after holding hydrochlorothiazide and lisinopril  Will continue with Lopressor  Patient is court hearing today   DC [place] [address].   Phone [phone]   Fax: [phone]  Answering Service: [phone]   
merlyn reports somebody stealing from her and wants to get her out of the house so they can take over her house.  APS involved  Dispo and discharge planning depending on patient assessment of capacity.      1/24  No new issues since yesterday  Appreciate psychiatry recommendations-patient does not have capacity.  Guardianship paperwork filled today  Awaiting placement.    1/26  No new issues overnight  Patient appears comfortable  Eating drinking lung sounds clear  Awaiting placement  Vitamin B12 is low-ordering IV B12, continue with p.o. at discharge.  Extensively distorted and big toenails puncturing into the skin, will get podiatry on board      Cherri [place]son [place] [address].   Phone [phone]   Fax: [phone]  Answering Service: [phone]   
merlyn reports somebody stealing from her and wants to get her out of the house so they can take over her house.  APS involved  Dispo and discharge planning depending on patient assessment of capacity.      1/24  No new issues since yesterday  Appreciate psychiatry recommendations-patient does not have capacity.  Guardianship paperwork filled today  Awaiting placement.    1/27  Seen examined face-to-face,  No new issues overnight  Patient appears comfortable  Eating drinking lung sounds clear  Awaiting placement  Vitamin B12 is low-ordering IV B12, continue with p.o. at discharge.  Extensively distorted and big toenails puncturing into the skin, podiatry on board  Guardianship awaited,  Possible discharge after that      Cherri [place]son [place] [address].   Phone [phone]   Fax: [phone]  Answering Service: [phone]   
places and pressing onto other toes and bottom of foot. has a callous on bottom of each foot and black spots in between some toes. RN was called in to observe due to concern for skin. ed pt that a podiatrist is needed as pt states \"They've been this bad before and I cut em back.\"  post ed, pt continues to state she can cut them.  states \"I just got to soak them.\"  Education Method: Verbal, Demonstration  Barriers to Learning: Cognition, Hearing  Education Outcome: Verbalized understanding, Continued education needed    Goals  Short Term Goals  Time Frame for Short Term Goals: By discharge, pt will  Short Term Goal 1: perform functional transfers/mobility during self care tasks with SBA, least restrictive device, and Good safety  Short Term Goal 2: perform upper body ADLs with setup assistance  Short Term Goal 3: perform lower body ADLs with SBA and adaptive equipment as needed  Short Term Goal 4: tolerate standing for 6+ minutes during self care tasks with CGA and no loss of balance  Short Term Goal 5: actively participate in 15+ minutes of therapeutic exercise/functional activity to increase overall strength/endurance needed for ADLs/IADLs  Occupational Therapy Plan  Times Per Week: 4-6  Current Treatment Recommendations: Strengthening, Balance training, Functional mobility training, Endurance training, Pain management, Safety education & training, Patient/Caregiver education & training, Equipment evaluation, education, & procurement, Self-Care / ADL, Home management training, Coordination training    Assessment  Activity Tolerance  Activity Tolerance: Patient Tolerated treatment well  Assessment  Performance deficits / Impairments: Decreased functional mobility , Decreased ADL status, Decreased strength, Decreased endurance, Decreased sensation, Decreased balance, Decreased safe awareness, Decreased cognition, Decreased high-level IADLs, Decreased coordination, Decreased posture, Decreased fine motor 
tasks with CGA, least restrictive device, and Good safety  Short Term Goal 2: perform upper body ADLs with setup assistance  Short Term Goal 3: perform lower body ADLs with Min A and adaptive equipment as needed  Short Term Goal 4: tolerate standing for 6+ minutes during self care tasks with CGA and no loss of balance  Short Term Goal 5: actively participate in 15+ minutes of therapeutic exercise/functional activity to increase overall strength/endurance needed for ADLs/IADLs  Occupational Therapy Plan  Times Per Week: 4-6  Current Treatment Recommendations: Strengthening, Balance training, Functional mobility training, Endurance training, Pain management, Safety education & training, Patient/Caregiver education & training, Equipment evaluation, education, & procurement, Self-Care / ADL, Home management training, Coordination training    Assessment  Activity Tolerance  Activity Tolerance: Patient Tolerated treatment well  Assessment  Performance deficits / Impairments: Decreased functional mobility , Decreased ADL status, Decreased strength, Decreased endurance, Decreased sensation, Decreased balance, Decreased safe awareness, Decreased cognition, Decreased high-level IADLs, Decreased coordination, Decreased posture, Decreased fine motor control  Treatment Diagnosis: impaired self care tasks  Prognosis: Good  Decision Making: Medium Complexity  Discharge Recommendations: Patient would benefit from continued therapy after discharge  OT Equipment Recommendations  Other: TBD  Safety Devices  Type of Devices: Nurse notified, Left in chair, Call light within reach, All fall risk precautions in place, Chair alarm in place, Gait belt    AM-PAC Daily Activities Inpatient  AM-PAC Daily Activity - Inpatient   How much help is needed for putting on and taking off regular lower body clothing?: A Lot  How much help is needed for bathing (which includes washing, rinsing, drying)?: A Lot  How much help is needed for toileting 
worked out.      I notified Mimi Jarquin from Case Management and clinical lead that documents were being signed.  I can not confirm what documents they are working on.  I explained to staff that I would not consider these documents as legal.  Guardianship meeting is going on at 1100.  I explained I would want documentation from the court to follow for decision maker.      Received Probate court of [facility]: Letters of Guardianship.  Silvio Boyd is guardian of Scarlett's person and Estate.    Writer contacted Mr. Boyd and discussed that prior to psychiatric evaluation palliative care provider met with patient and patient appeared clear in her wishes for no cpr and no ventilator.  DNRCC code status was ordered.  With the need for guardianship at present we in palliative care office want to seek guardian's recommendation on code status.  I explained that I met with patient today and she expressed same wishes even after I explained the consequences of providers not doing interventions if her heart stops.  Her wishes have not changed over time and that patient's neighbor was in and confirmed DNR as Scarlett's expressed wishes.  Mr. Boyd is agreeable to keep the code status as full code siting patient's multiple expressions of this wish, and patient's medical status    Updated bedside nurse Phan that code status will remain DNRCC.    Decision was made to [facility] Scar[facility].  We are waiting on precert.      Goals/Plan of care  Education/support to staff  Education/support to patient  Communications with primary service  Providing support for coping/adaptation/distress of patient  Discussing meaning/purpose   Continue with current plan of care  Code status clarified: DNRCC  Continued communication updates  Guardianship established per court system and documented in chart.   Code status to remain DNRCC per guardian.    Palliative Care Coordinator  Gertrude VELASQUEZN, RN, 
Patient/Caregiver education & training, Equipment evaluation, education, & procurement, Self-Care / ADL, Home management training, Coordination training    Assessment  Activity Tolerance  Activity Tolerance: Patient Tolerated treatment well  Assessment  Performance deficits / Impairments: Decreased functional mobility , Decreased ADL status, Decreased strength, Decreased endurance, Decreased sensation, Decreased balance, Decreased safe awareness, Decreased cognition, Decreased high-level IADLs, Decreased coordination, Decreased posture, Decreased fine motor control  Assessment: per MD note 1-24: Patient unable to walk, however keeps refusing placement. Consult psych for capacity eval. Guardianship paperwork filled today Awaiting placement.  per psychiatry note 1-24: Patient lacks capacity to leave AMA or make medical decisions.  Case management in contact with adult , working on guardianship and placement.  OT agrees with asssessment that pt lacks cognition for independently managing her own advanced adls, safety and medical management without assist.  Treatment Diagnosis: impaired self care tasks  Prognosis: Good  Decision Making: Medium Complexity  Discharge Recommendations: Patient would benefit from continued therapy after discharge  OT Equipment Recommendations  Other: TBD  Safety Devices  Type of Devices: Call light within reach, Gait belt, Left in bed, All fall risk precautions in place, Bed alarm in place    AM-PAC Daily Activities Inpatient  AM-PAC Daily Activity - Inpatient   How much help is needed for putting on and taking off regular lower body clothing?: A Lot  How much help is needed for bathing (which includes washing, rinsing, drying)?: A Lot  How much help is needed for toileting (which includes using toilet, bedpan, or urinal)?: A Lot  How much help is needed for putting on and taking off regular upper body clothing?: A Little  How much help is needed for taking care 
discharge  OT Equipment Recommendations  Other: TBD  Safety Devices  Type of Devices: Call light within reach, Gait belt, Left in bed, All fall risk precautions in place, Bed alarm in place    AM-PAC Daily Activities Inpatient  AM-PAC Daily Activity - Inpatient   How much help is needed for putting on and taking off regular lower body clothing?: A Little  How much help is needed for bathing (which includes washing, rinsing, drying)?: A Little  How much help is needed for toileting (which includes using toilet, bedpan, or urinal)?: A Little  How much help is needed for putting on and taking off regular upper body clothing?: A Little  How much help is needed for taking care of personal grooming?: A Little  How much help for eating meals?: A Little  AM-PAC Inpatient Daily Activity Raw Score: 18  AM-PAC Inpatient ADL T-Scale Score : 38.66  ADL Inpatient CMS 0-100% Score: 46.65  ADL Inpatient CMS G-Code Modifier : CK    OT Minutes  OT Individual Minutes  Time In: 1510  Time Out: 1536  Minutes: 26      Electronically signed by SOURAV More on 1/26/24 at 4:00 PM EST  
enters/exits room)    AM-[facility] Daily Activities Inpatient    AM-PAC Daily Activity - Inpatient   How much help is needed for putting on and taking off regular lower body clothing?: Total  How much help is needed for bathing (which includes washing, rinsing, drying)?: A Lot  How much help is needed for toileting (which includes using toilet, bedpan, or urinal)?: Total  How much help is needed for putting on and taking off regular upper body clothing?: A Little  How much help is needed for taking care of personal grooming?: A Little  How much help for eating meals?: A Little  AM-[facility] Inpatient Daily Activity Raw Score: 13  AM-PAC Inpatient ADL T-Scale Score : 32.03  ADL Inpatient CMS 0-100% Score: 63.03  ADL Inpatient CMS G-Code Modifier : CL        OT Minutes  OT Individual Minutes  Time In: 1618  Time Out: 1633  Minutes: 15      Electronically signed by SOURAV More on 1/25/24 at 4:57 PM EST  
improved  No significant problem for more than 10 days  No history of syncope  No exertional angina  Normal LV systolic function ejection fraction 55 to 60%  Mild pulmonary hypertension right ventricle systolic pressure 47 mmHg  Hypertension significant improvement in the blood pressure  Mild anemia  Degenerative joint disease both knees, marked bowing of both legs, kyphosis, ambulates with a walker  Able to get out of bed without help and walk    Patient Active Problem List:     Hypertensive urgency     Essential hypertension     Noncompliance     Supraventricular tachycardia     Poor hygiene     Age-related physical debility     Hypokalemia     Delirium due to another medical condition     Major neurocognitive disorder (HCC)      Plan of Treatment:   Medications reviewed  1: Hypertension,, patient had low blood pressure yesterday increasing BUN and potassium, HCTZ and lisinopril on hold  Keep checking blood pressure if systolic blood pressure is above 135 restart lisinopril 10 mg a day continue to hold hydrochlorothiazide  2: Episode of SVT, frequent PAC on metoprolol 12.5 mg twice daily  Ambulate as tolerated  Waiting for placement    Electronically signed by Monique Landa MD on 2/2/2024 at 2:54 PM  
IntraVENous, Q6H PRN, Di Resendez APRN - NP    polyethylene glycol (GLYCOLAX) packet 17 g, 17 g, Oral, Daily PRN, Di Resendez APRN - NP    acetaminophen (TYLENOL) tablet 650 mg, 650 mg, Oral, Q6H PRN **OR** acetaminophen (TYLENOL) suppository 650 mg, 650 mg, Rectal, Q6H PRN, Di Resendez APRN - NP    perflutren lipid microspheres (DEFINITY) injection 1.5 mL, 1.5 mL, IntraVENous, ONCE PRN, Di Resendez APRN - NP    hydrALAZINE (APRESOLINE) injection 10 mg, 10 mg, IntraVENous, Q6H PRN, Di Resendez APRN - NP, 10 mg at 01/23/24 0638      Examination:  BP (!) 147/62   Pulse 66   Temp 97.9 °F (36.6 °C)   Resp 16   Ht 1.524 m (5')   Wt 60.3 kg (132 lb 15 oz)   SpO2 92%   BMI 25.96 kg/m²     Mental Status Examination:    Level of consciousness:  within normal limits   Appearance:  ill-appearing, hospital attire, seated in bed, poor grooming, and poor hygiene  Behavior/Motor:  no abnormalities noted  Attitude toward examiner:  cooperative, attentive, and good eye contact  Speech:  increased latency in responses, normal rate, and normal volume   Mood: within normal limits  Affect:  mood congruent  Thought processes:  linear, goal directed, and coherent   Thought content:  Preoccupied with going home   Suicidal Ideation:  denies suicidal ideation  Cognition:  oriented to person, place, and time   Concentration intact  Memory impaired recent memory and remote memory  Insight poor   Judgement poor   Fund of Knowledge limited     ASSESSMENT:   Patient symptoms are:  [] Well controlled  [] Improving  [] Worsening  [x] No change      Diagnosis:   Principal Problem:    Hypertensive urgency  Active Problems:    Essential hypertension    Noncompliance    Supraventricular tachycardia    Poor hygiene    Age-related physical debility    Hypokalemia    Delirium due to another medical condition  Resolved Problems:    * No resolved hospital problems. *    Hypertensive urgency  Delirium due to medical 
MD Kary, 10 mL at 01/21/24 1135    enoxaparin (LOVENOX) injection 40 mg, 40 mg, SubCUTAneous, Daily, Kary Don MD, 40 mg at 01/30/24 0822    metoprolol tartrate (LOPRESSOR) tablet 12.5 mg, 12.5 mg, Oral, BID, Monique Landa MD, 12.5 mg at 01/30/24 0821    melatonin tablet 3 mg, 3 mg, Oral, Nightly PRN, Waterhouse, Shirley Ann, APRN - CNP, 3 mg at 01/20/24 2239    sodium chloride flush 0.9 % injection 5-40 mL, 5-40 mL, IntraVENous, 2 times per day, Di Resendez APRN - NP, 10 mL at 01/22/24 1022    sodium chloride flush 0.9 % injection 5-40 mL, 5-40 mL, IntraVENous, PRN, Di Resenedz APRN - NP    0.9 % sodium chloride infusion, , IntraVENous, PRN, Di Resendez APRN - NP    potassium chloride (KLOR-CON M) extended release tablet 40 mEq, 40 mEq, Oral, PRN **OR** potassium bicarb-citric acid (EFFER-K) effervescent tablet 40 mEq, 40 mEq, Oral, PRN **OR** potassium chloride 10 mEq/100 mL IVPB (Peripheral Line), 10 mEq, IntraVENous, PRN, Di Resendez APRN - NP    magnesium sulfate 2000 mg in water 50 mL IVPB, 2,000 mg, IntraVENous, PRN, Di Resendez APRN - NP    ondansetron (ZOFRAN-ODT) disintegrating tablet 4 mg, 4 mg, Oral, Q8H PRN **OR** ondansetron (ZOFRAN) injection 4 mg, 4 mg, IntraVENous, Q6H PRN, Di Resendez APRN - NP    polyethylene glycol (GLYCOLAX) packet 17 g, 17 g, Oral, Daily PRN, Di Resendez APRN - NP    acetaminophen (TYLENOL) tablet 650 mg, 650 mg, Oral, Q6H PRN **OR** acetaminophen (TYLENOL) suppository 650 mg, 650 mg, Rectal, Q6H PRN, Di Resendez, APRN - NP    perflutren lipid microspheres (DEFINITY) injection 1.5 mL, 1.5 mL, IntraVENous, ONCE PRN, Di Resendez, APRN - NP    hydrALAZINE (APRESOLINE) injection 10 mg, 10 mg, IntraVENous, Q6H PRN, Di Resendez APRN - NP, 10 mg at 01/23/24 0638      Examination:  /77   Pulse 69   Temp 97.9 °F (36.6 °C) (Oral)   Resp 17   Ht 1.524 m (5')   Wt 60.3 kg (132 lb 15 oz)   SpO2 100%   BMI 25.96 
metoprolol tartrate (LOPRESSOR) tablet 12.5 mg, 12.5 mg, Oral, BID, Monique Landa MD, 12.5 mg at 02/01/24 2022    melatonin tablet 3 mg, 3 mg, Oral, Nightly PRN, Waterhouse, Shirley Ann, APRN - CNP, 3 mg at 01/20/24 2239    sodium chloride flush 0.9 % injection 5-40 mL, 5-40 mL, IntraVENous, 2 times per day, Di Resendez APRN - NP, 10 mL at 01/22/24 1022    sodium chloride flush 0.9 % injection 5-40 mL, 5-40 mL, IntraVENous, PRN, Di Resendez APRN - NP    0.9 % sodium chloride infusion, , IntraVENous, PRN, Di Resendez APRN - NP    potassium chloride (KLOR-CON M) extended release tablet 40 mEq, 40 mEq, Oral, PRN **OR** potassium bicarb-citric acid (EFFER-K) effervescent tablet 40 mEq, 40 mEq, Oral, PRN **OR** potassium chloride 10 mEq/100 mL IVPB (Peripheral Line), 10 mEq, IntraVENous, PRNDipti Brittney, APRN - NP    magnesium sulfate 2000 mg in water 50 mL IVPB, 2,000 mg, IntraVENous, PRN, Di Resendez APRN - NP    ondansetron (ZOFRAN-ODT) disintegrating tablet 4 mg, 4 mg, Oral, Q8H PRN **OR** ondansetron (ZOFRAN) injection 4 mg, 4 mg, IntraVENous, Q6H PRN, Di Resendez APRN - NP    polyethylene glycol (GLYCOLAX) packet 17 g, 17 g, Oral, Daily PRN, Di Resendez APRN - NP    acetaminophen (TYLENOL) tablet 650 mg, 650 mg, Oral, Q6H PRN **OR** acetaminophen (TYLENOL) suppository 650 mg, 650 mg, Rectal, Q6H PRN, Di Resendez APRN - NP    perflutren lipid microspheres (DEFINITY) injection 1.5 mL, 1.5 mL, IntraVENous, ONCE PRN, Goldi, Di, APRN - NP    hydrALAZINE (APRESOLINE) injection 10 mg, 10 mg, IntraVENous, Q6H PRN, Di Resendez, APRN - NP, 10 mg at 01/23/24 0638      Examination:  BP (!) 116/51   Pulse 70   Temp 98.4 °F (36.9 °C)   Resp 18   Ht 1.524 m (5')   Wt 60.3 kg (132 lb 15 oz)   SpO2 97%   BMI 25.96 kg/m²     Mental Status Examination:    Level of consciousness:  alert and awake  Appearance: Fair grooming and hygiene   behavior/Motor:  no abnormalities 
patient and treatment plan.  Reviewed the chart.   Continue to develop insight.  Patient lacks capacity to leave AMA.  Case management in contact with adult , working on guardianship and placement.    Received vitamin B12 injection yesterday.   Consider adding acetylcholinesterase inhibitor.  Further medical management per primary team.     At this time, she does not understand the state or significance/management of her medical conditions and does not recognize the state of her living arrangements. She currently lacks the capacity to leave AMA.    PSYCHOTHERAPY/COUNSELING:  [] Therapeutic interview  [x] Supportive  [] CBT  [] Ongoing  [] Other      --Ross Newsome, OMS-IV on 1/26/2024 at 12:25 PM    An electronic signature was used to authenticate this note.   I independently saw and evaluated the patient.  I reviewed the  documentation above    .  Any additional comments or changes to the   documentation are stated below otherwise agree with assessment.      The patient is pleasant on approach.  She continues to minimize the circumstances that led to her admission.  She is willing to go to an assisted living facility.  She was educated about the vitamin B12 deficiency that was found in her blood tests and the importance of taking adequate nutrition.  At this time we will hold off on starting psychotropic medications.  The patient should follow in the outpatient setting to have her cognition reevaluated in the next several weeks to establish a baseline once her nutrition is adequate    No Medication Changes Today    PLAN  Medications as noted above  Attempt to develop insight  Psycho-education conducted.  Supportive Therapy conducted.  Follow-up daily while on inpatient unit    Electronically signed by SHANEKA JONES MD on 1/26/24 at 5:02 PM EST

## 2024-02-07 NOTE — CARE COORDINATION
Transportation arranged for patient to go to [facility] at 1200 via [facility] by stretcher. Facility informed. Bedside nurse informed.     Number for Report: [phone]  Electronically signed by LEONEL Yuen on 2/7/2024 at 9:19 AM

## 2024-02-07 NOTE — CARE COORDINATION
HENS complete.  Document ID : [id number]  Electronically signed by LEONEL Yuen on 2/7/2024 at 9:22 AM

## 2024-02-07 NOTE — CARE COORDINATION
IMM letter provided to patient legal guardian.  Patient guardian offered four hours to make informed decision regarding appeal process; patient guardian agreeable to discharge.     Pt legal guardian Ed requests a copy of this letter e-mailed to [email or web address]   Electronically signed by LEONEL Yuen on 2/7/2024 at 9:22 AM

## 2024-02-07 NOTE — CARE COORDINATION
Writer received call from [facility] representative that pt peer to peer was overturned.   Authorization # [id number] approved for 6 days, next review on 2/12.    Writer placed call to [facility] with [employer] to provide this information. Agreeable to writer scheduling transport.  Electronically signed by LEONEL Yuen on 2/7/2024 at 9:17 AM

## 2024-02-07 NOTE — CARE COORDINATION
Writer placed call to med surge unit, spoke to charge RN Iza requesting TYESHA to be signed by bedside RN.  Electronically signed by LEONEL Yuen on 2/7/2024 at 9:19 AM
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
